# Patient Record
Sex: FEMALE
[De-identification: names, ages, dates, MRNs, and addresses within clinical notes are randomized per-mention and may not be internally consistent; named-entity substitution may affect disease eponyms.]

---

## 2024-09-10 ENCOUNTER — NON-APPOINTMENT (OUTPATIENT)
Age: 29
End: 2024-09-10

## 2024-09-11 ENCOUNTER — APPOINTMENT (OUTPATIENT)
Dept: OBGYN | Facility: CLINIC | Age: 29
End: 2024-09-11
Payer: MEDICAID

## 2024-09-11 ENCOUNTER — OUTPATIENT (OUTPATIENT)
Dept: OUTPATIENT SERVICES | Facility: HOSPITAL | Age: 29
LOS: 1 days | End: 2024-09-11
Payer: MEDICAID

## 2024-09-11 VITALS
DIASTOLIC BLOOD PRESSURE: 64 MMHG | BODY MASS INDEX: 26.65 KG/M2 | SYSTOLIC BLOOD PRESSURE: 98 MMHG | HEIGHT: 64.5 IN | HEART RATE: 76 BPM | OXYGEN SATURATION: 100 % | WEIGHT: 158 LBS | RESPIRATION RATE: 18 BRPM | TEMPERATURE: 99.1 F

## 2024-09-11 DIAGNOSIS — Z34.00 ENCOUNTER FOR SUPERVISION OF NORMAL FIRST PREGNANCY, UNSPECIFIED TRIMESTER: ICD-10-CM

## 2024-09-11 DIAGNOSIS — Z12.39 ENCOUNTER FOR OTHER SCREENING FOR MALIGNANT NEOPLASM OF BREAST: ICD-10-CM

## 2024-09-11 DIAGNOSIS — Z11.3 ENCOUNTER FOR SCREENING FOR INFECTIONS WITH A PREDOMINANTLY SEXUAL MODE OF TRANSMISSION: ICD-10-CM

## 2024-09-11 DIAGNOSIS — Z3A.18 18 WEEKS GESTATION OF PREGNANCY: ICD-10-CM

## 2024-09-11 DIAGNOSIS — Z01.419 ENCOUNTER FOR GYNECOLOGICAL EXAMINATION (GENERAL) (ROUTINE) W/OUT ABNORMAL FINDINGS: ICD-10-CM

## 2024-09-11 DIAGNOSIS — Z12.4 ENCOUNTER FOR SCREENING FOR MALIGNANT NEOPLASM OF CERVIX: ICD-10-CM

## 2024-09-11 PROBLEM — Z00.00 ENCOUNTER FOR PREVENTIVE HEALTH EXAMINATION: Status: ACTIVE | Noted: 2024-09-11

## 2024-09-11 PROCEDURE — 81420 FETAL CHRMOML ANEUPLOIDY: CPT

## 2024-09-11 PROCEDURE — 87491 CHLMYD TRACH DNA AMP PROBE: CPT

## 2024-09-11 PROCEDURE — 87624 HPV HI-RISK TYP POOLED RSLT: CPT

## 2024-09-11 PROCEDURE — 81329 SMN1 GENE DOS/DELETION ALYS: CPT

## 2024-09-11 PROCEDURE — 86850 RBC ANTIBODY SCREEN: CPT

## 2024-09-11 PROCEDURE — 86762 RUBELLA ANTIBODY: CPT

## 2024-09-11 PROCEDURE — 86787 VARICELLA-ZOSTER ANTIBODY: CPT

## 2024-09-11 PROCEDURE — 86780 TREPONEMA PALLIDUM: CPT

## 2024-09-11 PROCEDURE — 81243 FMR1 GEN ALY DETC ABNL ALLEL: CPT

## 2024-09-11 PROCEDURE — 83655 ASSAY OF LEAD: CPT

## 2024-09-11 PROCEDURE — 83020 HEMOGLOBIN ELECTROPHORESIS: CPT

## 2024-09-11 PROCEDURE — 83036 HEMOGLOBIN GLYCOSYLATED A1C: CPT

## 2024-09-11 PROCEDURE — 81220 CFTR GENE COM VARIANTS: CPT

## 2024-09-11 PROCEDURE — 82105 ALPHA-FETOPROTEIN SERUM: CPT

## 2024-09-11 PROCEDURE — 82306 VITAMIN D 25 HYDROXY: CPT

## 2024-09-11 PROCEDURE — 84443 ASSAY THYROID STIM HORMONE: CPT

## 2024-09-11 PROCEDURE — 81003 URINALYSIS AUTO W/O SCOPE: CPT

## 2024-09-11 PROCEDURE — 86765 RUBEOLA ANTIBODY: CPT

## 2024-09-11 PROCEDURE — 86480 TB TEST CELL IMMUN MEASURE: CPT

## 2024-09-11 PROCEDURE — 86803 HEPATITIS C AB TEST: CPT

## 2024-09-11 PROCEDURE — 99204 OFFICE O/P NEW MOD 45 MIN: CPT | Mod: TH,25

## 2024-09-11 PROCEDURE — 36415 COLL VENOUS BLD VENIPUNCTURE: CPT

## 2024-09-11 PROCEDURE — 87389 HIV-1 AG W/HIV-1&-2 AB AG IA: CPT

## 2024-09-11 PROCEDURE — 87086 URINE CULTURE/COLONY COUNT: CPT

## 2024-09-11 PROCEDURE — 81025 URINE PREGNANCY TEST: CPT

## 2024-09-11 PROCEDURE — 84439 ASSAY OF FREE THYROXINE: CPT

## 2024-09-11 PROCEDURE — G0463: CPT

## 2024-09-11 PROCEDURE — 87340 HEPATITIS B SURFACE AG IA: CPT

## 2024-09-11 PROCEDURE — 86900 BLOOD TYPING SEROLOGIC ABO: CPT

## 2024-09-11 PROCEDURE — 85025 COMPLETE CBC W/AUTO DIFF WBC: CPT

## 2024-09-11 PROCEDURE — 87591 N.GONORRHOEAE DNA AMP PROB: CPT

## 2024-09-11 RX ORDER — CHOLECALCIFEROL (VITAMIN D3) 25 MCG
TABLET ORAL
Refills: 0 | Status: COMPLETED | COMMUNITY
End: 2024-09-11

## 2024-09-11 RX ORDER — PNV NO.95/FERROUS FUM/FOLIC AC 28MG-0.8MG
28-0.8 TABLET ORAL DAILY
Qty: 30 | Refills: 11 | Status: ACTIVE | COMMUNITY
Start: 2024-09-11 | End: 1900-01-01

## 2024-09-12 DIAGNOSIS — O99.019 ANEMIA COMPLICATING PREGNANCY, UNSPECIFIED TRIMESTER: ICD-10-CM

## 2024-09-12 LAB
25(OH)D3 SERPL-MCNC: 27.6 NG/ML
ABO + RH PNL BLD: NORMAL
BASOPHILS # BLD AUTO: 0.03 K/UL
BASOPHILS NFR BLD AUTO: 0.5 %
BLD GP AB SCN SERPL QL: NORMAL
C TRACH RRNA SPEC QL NAA+PROBE: NOT DETECTED
EOSINOPHIL # BLD AUTO: 0.05 K/UL
EOSINOPHIL NFR BLD AUTO: 0.9 %
ESTIMATED AVERAGE GLUCOSE: 108 MG/DL
HBA1C MFR BLD HPLC: 5.4 %
HBV SURFACE AG SER QL: NONREACTIVE
HCT VFR BLD CALC: 31.2 %
HCV AB SER QL: NONREACTIVE
HCV S/CO RATIO: 0.12 S/CO
HGB BLD-MCNC: 10 G/DL
HIV1+2 AB SPEC QL IA.RAPID: NONREACTIVE
IMM GRANULOCYTES NFR BLD AUTO: 0.7 %
LYMPHOCYTES # BLD AUTO: 2.03 K/UL
LYMPHOCYTES NFR BLD AUTO: 37 %
MAN DIFF?: NORMAL
MCHC RBC-ENTMCNC: 26.9 PG
MCHC RBC-ENTMCNC: 32.1 GM/DL
MCV RBC AUTO: 83.9 FL
MONOCYTES # BLD AUTO: 0.21 K/UL
MONOCYTES NFR BLD AUTO: 3.8 %
N GONORRHOEA RRNA SPEC QL NAA+PROBE: NOT DETECTED
NEUTROPHILS # BLD AUTO: 3.12 K/UL
NEUTROPHILS NFR BLD AUTO: 57.1 %
PLATELET # BLD AUTO: 359 K/UL
RBC # BLD: 3.72 M/UL
RBC # FLD: 13.7 %
SOURCE TP AMPLIFICATION: NORMAL
T4 FREE SERPL-MCNC: 1.1 NG/DL
TSH SERPL-ACNC: 0.99 UIU/ML
WBC # FLD AUTO: 5.48 K/UL

## 2024-09-12 RX ORDER — SAW PALMETTO 160 MG
500 CAPSULE ORAL
Qty: 60 | Refills: 9 | Status: ACTIVE | COMMUNITY
Start: 2024-09-12 | End: 1900-01-01

## 2024-09-12 RX ORDER — FERROUS SULFATE 325(65) MG
325 (65 FE) TABLET ORAL TWICE DAILY
Qty: 60 | Refills: 9 | Status: ACTIVE | COMMUNITY
Start: 2024-09-12 | End: 1900-01-01

## 2024-09-13 DIAGNOSIS — D57.3 SICKLE-CELL TRAIT: ICD-10-CM

## 2024-09-13 LAB
AF-AFP DISCLAIMER: NORMAL
AFP  MOM: 1.16
AFP CONCENTRATION: 63.13 NG/ML
AFP INTERPRETATION: NORMAL
AFP MOM CUT-OFF: 2.8
AFP PERCENTILE: 67.5
AFP SCREENING RESULT: NORMAL
AFTER SCREENING RISK OPEN SPINA BIFIDA: NORMAL
BACTERIA UR CULT: NORMAL
BEFORE SCREENING RISK OPEN SPINA BIFIDA: NORMAL
CARBAMAZEPINE?: NO
CURRENT SMOKER: NO
ESTIMATED DUE DATE: NORMAL
EXTREME ANALYTE ALERT: NO
FAMILY HISTORY OPEN SPINA BIFIDA: NO
GESTATIONAL  AGE: NORMAL
GESTATIONAL AGE METHOD: NORMAL
HGB A MFR BLD: 61.1 %
HGB A2 MFR BLD: 1.6 %
HGB F MFR BLD: 0.8 %
HGB FRACT BLD-IMP: NORMAL
HGB S BLD QL SOLY: POSITIVE
HGB S MFR BLD: 35.1 %
HPV HIGH+LOW RISK DNA PNL CVX: NOT DETECTED
INSULIN DEPEND DIABETES: NO
LEAD BLD-MCNC: 1.5 UG/DL
MATERNAL WGT: 158
MEV IGG FLD QL IA: 18.8 AU/ML
MEV IGG+IGM SER-IMP: POSITIVE
MULTIPLE PREGNANCY STATUS: NORMAL
RACE/ETHNICITY: NORMAL
RUBV IGG FLD-ACNC: 20.2 INDEX
RUBV IGG SER-IMP: POSITIVE
T PALLIDUM AB SER QL IA: NEGATIVE
VALPROIC ACID?: NO
VZV AB TITR SER: POSITIVE
VZV IGG SER IF-ACNC: 4.37 S/CO

## 2024-09-16 DIAGNOSIS — Z01.419 ENCOUNTER FOR GYNECOLOGICAL EXAMINATION (GENERAL) (ROUTINE) WITHOUT ABNORMAL FINDINGS: ICD-10-CM

## 2024-09-16 DIAGNOSIS — Z12.39 ENCOUNTER FOR OTHER SCREENING FOR MALIGNANT NEOPLASM OF BREAST: ICD-10-CM

## 2024-09-16 DIAGNOSIS — Z11.3 ENCOUNTER FOR SCREENING FOR INFECTIONS WITH A PREDOMINANTLY SEXUAL MODE OF TRANSMISSION: ICD-10-CM

## 2024-09-16 DIAGNOSIS — Z12.4 ENCOUNTER FOR SCREENING FOR MALIGNANT NEOPLASM OF CERVIX: ICD-10-CM

## 2024-09-16 DIAGNOSIS — Z3A.18 18 WEEKS GESTATION OF PREGNANCY: ICD-10-CM

## 2024-09-16 LAB
CHROMOSOME13 INTERPRETATION: NORMAL
CHROMOSOME13 TEST RESULT: NORMAL
CHROMOSOME18 INTERPRETATION: NORMAL
CHROMOSOME18 TEST RESULT: NORMAL
CHROMOSOME21 INTERPRETATION: NORMAL
CHROMOSOME21 TEST RESULT: NORMAL
CYTOLOGY CVX/VAG DOC THIN PREP: NORMAL
FETAL FRACTION: NORMAL
PERFORMANCE AND LIMITATIONS: NORMAL
SEX CHROMOSOME INTERPRETATION: NORMAL
SEX CHROMOSOME TEST RESULT: NORMAL
VERIFI PRENATAL TEST: NOT DETECTED

## 2024-09-16 RX ORDER — PRENATAL WITH FERROUS FUM AND FOLIC ACID 3080; 920; 120; 400; 22; 1.84; 3; 20; 10; 1; 12; 200; 27; 25; 2 [IU]/1; [IU]/1; MG/1; [IU]/1; MG/1; MG/1; MG/1; MG/1; MG/1; MG/1; UG/1; MG/1; MG/1; MG/1; MG/1
27-1 TABLET ORAL DAILY
Qty: 30 | Refills: 11 | Status: ACTIVE | COMMUNITY
Start: 2024-09-16 | End: 1900-01-01

## 2024-09-17 LAB
AR GENE MUT ANL BLD/T: NORMAL
FMR1 GENE MUT ANL BLD/T: NORMAL
M TB IFN-G BLD-IMP: NEGATIVE
QUANTIFERON TB PLUS MITOGEN MINUS NIL: 3.27 IU/ML
QUANTIFERON TB PLUS NIL: 0.04 IU/ML
QUANTIFERON TB PLUS TB1 MINUS NIL: 0.04 IU/ML
QUANTIFERON TB PLUS TB2 MINUS NIL: 0.01 IU/ML

## 2024-09-20 LAB — CFTR MUT TESTED BLD/T: NEGATIVE

## 2024-09-25 ENCOUNTER — APPOINTMENT (OUTPATIENT)
Dept: ANTEPARTUM | Facility: CLINIC | Age: 29
End: 2024-09-25
Payer: MEDICAID

## 2024-09-25 ENCOUNTER — ASOB RESULT (OUTPATIENT)
Age: 29
End: 2024-09-25

## 2024-09-25 PROCEDURE — 76811 OB US DETAILED SNGL FETUS: CPT

## 2024-10-09 ENCOUNTER — OUTPATIENT (OUTPATIENT)
Dept: OUTPATIENT SERVICES | Facility: HOSPITAL | Age: 29
LOS: 1 days | End: 2024-10-09
Payer: MEDICAID

## 2024-10-09 ENCOUNTER — APPOINTMENT (OUTPATIENT)
Dept: OBGYN | Facility: CLINIC | Age: 29
End: 2024-10-09
Payer: MEDICAID

## 2024-10-09 VITALS
HEART RATE: 92 BPM | TEMPERATURE: 97.5 F | WEIGHT: 170 LBS | DIASTOLIC BLOOD PRESSURE: 73 MMHG | OXYGEN SATURATION: 100 % | RESPIRATION RATE: 18 BRPM | BODY MASS INDEX: 28.67 KG/M2 | HEIGHT: 64.5 IN | SYSTOLIC BLOOD PRESSURE: 111 MMHG

## 2024-10-09 DIAGNOSIS — D57.3 SICKLE-CELL TRAIT: ICD-10-CM

## 2024-10-09 DIAGNOSIS — Z34.92 ENCOUNTER FOR SUPERVISION OF NORMAL PREGNANCY, UNSPECIFIED, SECOND TRIMESTER: ICD-10-CM

## 2024-10-09 DIAGNOSIS — R79.89 OTHER SPECIFIED ABNORMAL FINDINGS OF BLOOD CHEMISTRY: ICD-10-CM

## 2024-10-09 DIAGNOSIS — Z11.3 ENCOUNTER FOR SCREENING FOR INFECTIONS WITH A PREDOMINANTLY SEXUAL MODE OF TRANSMISSION: ICD-10-CM

## 2024-10-09 DIAGNOSIS — O99.019 ANEMIA COMPLICATING PREGNANCY, UNSPECIFIED TRIMESTER: ICD-10-CM

## 2024-10-09 DIAGNOSIS — Z12.39 ENCOUNTER FOR OTHER SCREENING FOR MALIGNANT NEOPLASM OF BREAST: ICD-10-CM

## 2024-10-09 DIAGNOSIS — Z01.419 ENCOUNTER FOR GYNECOLOGICAL EXAMINATION (GENERAL) (ROUTINE) W/OUT ABNORMAL FINDINGS: ICD-10-CM

## 2024-10-09 DIAGNOSIS — Z34.00 ENCOUNTER FOR SUPERVISION OF NORMAL FIRST PREGNANCY, UNSPECIFIED TRIMESTER: ICD-10-CM

## 2024-10-09 DIAGNOSIS — Z3A.18 18 WEEKS GESTATION OF PREGNANCY: ICD-10-CM

## 2024-10-09 DIAGNOSIS — Z98.890 OTHER SPECIFIED POSTPROCEDURAL STATES: ICD-10-CM

## 2024-10-09 PROCEDURE — 99214 OFFICE O/P EST MOD 30 MIN: CPT | Mod: TH

## 2024-10-09 PROCEDURE — 81003 URINALYSIS AUTO W/O SCOPE: CPT

## 2024-10-09 PROCEDURE — G0463: CPT

## 2024-10-09 RX ORDER — CHOLECALCIFEROL (VITAMIN D3) 25 MCG
25 MCG TABLET,CHEWABLE ORAL
Qty: 30 | Refills: 11 | Status: ACTIVE | COMMUNITY
Start: 2024-10-09 | End: 1900-01-01

## 2024-10-10 ENCOUNTER — APPOINTMENT (OUTPATIENT)
Dept: ANTEPARTUM | Facility: CLINIC | Age: 29
End: 2024-10-10

## 2024-10-10 DIAGNOSIS — D57.3 SICKLE-CELL TRAIT: ICD-10-CM

## 2024-10-10 DIAGNOSIS — O99.019 ANEMIA COMPLICATING PREGNANCY, UNSPECIFIED TRIMESTER: ICD-10-CM

## 2024-10-10 DIAGNOSIS — R79.89 OTHER SPECIFIED ABNORMAL FINDINGS OF BLOOD CHEMISTRY: ICD-10-CM

## 2024-10-10 DIAGNOSIS — Z3A.22 22 WEEKS GESTATION OF PREGNANCY: ICD-10-CM

## 2024-11-07 ENCOUNTER — NON-APPOINTMENT (OUTPATIENT)
Age: 29
End: 2024-11-07

## 2024-11-08 ENCOUNTER — OUTPATIENT (OUTPATIENT)
Dept: OUTPATIENT SERVICES | Facility: HOSPITAL | Age: 29
LOS: 1 days | End: 2024-11-08
Payer: MEDICAID

## 2024-11-08 ENCOUNTER — APPOINTMENT (OUTPATIENT)
Dept: OBGYN | Facility: CLINIC | Age: 29
End: 2024-11-08

## 2024-11-08 VITALS
DIASTOLIC BLOOD PRESSURE: 67 MMHG | HEIGHT: 64 IN | WEIGHT: 172 LBS | BODY MASS INDEX: 29.37 KG/M2 | SYSTOLIC BLOOD PRESSURE: 107 MMHG | TEMPERATURE: 97.2 F | RESPIRATION RATE: 18 BRPM | HEART RATE: 91 BPM | OXYGEN SATURATION: 98 %

## 2024-11-08 DIAGNOSIS — Z34.92 ENCOUNTER FOR SUPERVISION OF NORMAL PREGNANCY, UNSPECIFIED, SECOND TRIMESTER: ICD-10-CM

## 2024-11-08 DIAGNOSIS — O99.019 ANEMIA COMPLICATING PREGNANCY, UNSPECIFIED TRIMESTER: ICD-10-CM

## 2024-11-08 DIAGNOSIS — Z34.00 ENCOUNTER FOR SUPERVISION OF NORMAL FIRST PREGNANCY, UNSPECIFIED TRIMESTER: ICD-10-CM

## 2024-11-08 DIAGNOSIS — D57.3 SICKLE-CELL TRAIT: ICD-10-CM

## 2024-11-08 DIAGNOSIS — R79.89 OTHER SPECIFIED ABNORMAL FINDINGS OF BLOOD CHEMISTRY: ICD-10-CM

## 2024-11-08 PROCEDURE — G0463: CPT

## 2024-11-08 PROCEDURE — 99214 OFFICE O/P EST MOD 30 MIN: CPT | Mod: TH

## 2024-11-08 PROCEDURE — 81003 URINALYSIS AUTO W/O SCOPE: CPT

## 2024-11-11 ENCOUNTER — ASOB RESULT (OUTPATIENT)
Age: 29
End: 2024-11-11

## 2024-11-11 ENCOUNTER — APPOINTMENT (OUTPATIENT)
Dept: ANTEPARTUM | Facility: CLINIC | Age: 29
End: 2024-11-11
Payer: MEDICAID

## 2024-11-11 DIAGNOSIS — D57.3 SICKLE-CELL TRAIT: ICD-10-CM

## 2024-11-11 DIAGNOSIS — R79.89 OTHER SPECIFIED ABNORMAL FINDINGS OF BLOOD CHEMISTRY: ICD-10-CM

## 2024-11-11 DIAGNOSIS — Z34.92 ENCOUNTER FOR SUPERVISION OF NORMAL PREGNANCY, UNSPECIFIED, SECOND TRIMESTER: ICD-10-CM

## 2024-11-11 DIAGNOSIS — O99.019 ANEMIA COMPLICATING PREGNANCY, UNSPECIFIED TRIMESTER: ICD-10-CM

## 2024-11-11 PROCEDURE — 76816 OB US FOLLOW-UP PER FETUS: CPT

## 2024-11-22 ENCOUNTER — NON-APPOINTMENT (OUTPATIENT)
Age: 29
End: 2024-11-22

## 2024-11-25 ENCOUNTER — OUTPATIENT (OUTPATIENT)
Dept: OUTPATIENT SERVICES | Facility: HOSPITAL | Age: 29
LOS: 1 days | End: 2024-11-25
Payer: MEDICAID

## 2024-11-25 ENCOUNTER — APPOINTMENT (OUTPATIENT)
Dept: OBGYN | Facility: CLINIC | Age: 29
End: 2024-11-25
Payer: MEDICAID

## 2024-11-25 VITALS
HEIGHT: 64 IN | SYSTOLIC BLOOD PRESSURE: 114 MMHG | DIASTOLIC BLOOD PRESSURE: 75 MMHG | OXYGEN SATURATION: 99 % | HEART RATE: 105 BPM | WEIGHT: 175 LBS | TEMPERATURE: 97 F | RESPIRATION RATE: 18 BRPM | BODY MASS INDEX: 29.88 KG/M2

## 2024-11-25 DIAGNOSIS — Z71.85 ENCOUNTER FOR IMMUNIZATION SAFETY COUNSELING: ICD-10-CM

## 2024-11-25 DIAGNOSIS — O99.019 ANEMIA COMPLICATING PREGNANCY, UNSPECIFIED TRIMESTER: ICD-10-CM

## 2024-11-25 DIAGNOSIS — D57.3 SICKLE-CELL TRAIT: ICD-10-CM

## 2024-11-25 DIAGNOSIS — Z34.00 ENCOUNTER FOR SUPERVISION OF NORMAL FIRST PREGNANCY, UNSPECIFIED TRIMESTER: ICD-10-CM

## 2024-11-25 DIAGNOSIS — Z34.92 ENCOUNTER FOR SUPERVISION OF NORMAL PREGNANCY, UNSPECIFIED, SECOND TRIMESTER: ICD-10-CM

## 2024-11-25 DIAGNOSIS — R79.89 OTHER SPECIFIED ABNORMAL FINDINGS OF BLOOD CHEMISTRY: ICD-10-CM

## 2024-11-25 DIAGNOSIS — Z34.93 ENCOUNTER FOR SUPERVISION OF NORMAL PREGNANCY, UNSPECIFIED, THIRD TRIMESTER: ICD-10-CM

## 2024-11-25 DIAGNOSIS — Z23 ENCOUNTER FOR IMMUNIZATION: ICD-10-CM

## 2024-11-25 PROCEDURE — 82570 ASSAY OF URINE CREATININE: CPT

## 2024-11-25 PROCEDURE — 96372 THER/PROPH/DIAG INJ SC/IM: CPT

## 2024-11-25 PROCEDURE — 86780 TREPONEMA PALLIDUM: CPT

## 2024-11-25 PROCEDURE — 80053 COMPREHEN METABOLIC PANEL: CPT

## 2024-11-25 PROCEDURE — 84550 ASSAY OF BLOOD/URIC ACID: CPT

## 2024-11-25 PROCEDURE — 36415 COLL VENOUS BLD VENIPUNCTURE: CPT

## 2024-11-25 PROCEDURE — 90715 TDAP VACCINE 7 YRS/> IM: CPT

## 2024-11-25 PROCEDURE — 82950 GLUCOSE TEST: CPT

## 2024-11-25 PROCEDURE — 90715 TDAP VACCINE 7 YRS/> IM: CPT | Mod: NC

## 2024-11-25 PROCEDURE — G0463: CPT

## 2024-11-25 PROCEDURE — 99214 OFFICE O/P EST MOD 30 MIN: CPT | Mod: TH,25

## 2024-11-25 PROCEDURE — 83036 HEMOGLOBIN GLYCOSYLATED A1C: CPT

## 2024-11-25 PROCEDURE — 85025 COMPLETE CBC W/AUTO DIFF WBC: CPT

## 2024-11-25 PROCEDURE — 84156 ASSAY OF PROTEIN URINE: CPT

## 2024-11-26 ENCOUNTER — NON-APPOINTMENT (OUTPATIENT)
Age: 29
End: 2024-11-26

## 2024-11-26 DIAGNOSIS — R79.89 OTHER SPECIFIED ABNORMAL FINDINGS OF BLOOD CHEMISTRY: ICD-10-CM

## 2024-11-26 DIAGNOSIS — Z34.93 ENCOUNTER FOR SUPERVISION OF NORMAL PREGNANCY, UNSPECIFIED, THIRD TRIMESTER: ICD-10-CM

## 2024-11-26 DIAGNOSIS — Z23 ENCOUNTER FOR IMMUNIZATION: ICD-10-CM

## 2024-11-26 DIAGNOSIS — O24.410 GESTATIONAL DIABETES MELLITUS IN PREGNANCY, DIET CONTROLLED: ICD-10-CM

## 2024-11-26 DIAGNOSIS — O99.019 ANEMIA COMPLICATING PREGNANCY, UNSPECIFIED TRIMESTER: ICD-10-CM

## 2024-11-26 DIAGNOSIS — D57.3 SICKLE-CELL TRAIT: ICD-10-CM

## 2024-11-26 LAB
ALBUMIN SERPL ELPH-MCNC: 3.6 G/DL
ALP BLD-CCNC: 109 U/L
ALT SERPL-CCNC: 9 U/L
ANION GAP SERPL CALC-SCNC: 11 MMOL/L
AST SERPL-CCNC: 10 U/L
BASOPHILS # BLD AUTO: 0.02 K/UL
BASOPHILS NFR BLD AUTO: 0.3 %
BILIRUB SERPL-MCNC: 0.3 MG/DL
BUN SERPL-MCNC: 7 MG/DL
CALCIUM SERPL-MCNC: 8.3 MG/DL
CHLORIDE SERPL-SCNC: 102 MMOL/L
CO2 SERPL-SCNC: 21 MMOL/L
CREAT SERPL-MCNC: 0.63 MG/DL
CREAT SPEC-SCNC: 193 MG/DL
CREAT/PROT UR: 0.1 RATIO
EGFR: 122 ML/MIN/1.73M2
EOSINOPHIL # BLD AUTO: 0.07 K/UL
EOSINOPHIL NFR BLD AUTO: 1.1 %
ESTIMATED AVERAGE GLUCOSE: 108 MG/DL
GLUCOSE 1H P 50 G GLC PO SERPL-MCNC: 179 MG/DL
GLUCOSE SERPL-MCNC: 180 MG/DL
HBA1C MFR BLD HPLC: 5.4 %
HCT VFR BLD CALC: 34.7 %
HGB BLD-MCNC: 11.7 G/DL
IMM GRANULOCYTES NFR BLD AUTO: 0.5 %
LYMPHOCYTES # BLD AUTO: 2.02 K/UL
LYMPHOCYTES NFR BLD AUTO: 30.7 %
MAN DIFF?: NORMAL
MCHC RBC-ENTMCNC: 27.1 PG
MCHC RBC-ENTMCNC: 33.7 G/DL
MCV RBC AUTO: 80.3 FL
MONOCYTES # BLD AUTO: 0.23 K/UL
MONOCYTES NFR BLD AUTO: 3.5 %
NEUTROPHILS # BLD AUTO: 4.2 K/UL
NEUTROPHILS NFR BLD AUTO: 63.9 %
PLATELET # BLD AUTO: 239 K/UL
POTASSIUM SERPL-SCNC: 3.8 MMOL/L
PROT SERPL-MCNC: 6.7 G/DL
PROT UR-MCNC: 13 MG/DL
RBC # BLD: 4.32 M/UL
RBC # FLD: 12.8 %
SODIUM SERPL-SCNC: 134 MMOL/L
T PALLIDUM AB SER QL IA: NEGATIVE
URATE SERPL-MCNC: 4.2 MG/DL
WBC # FLD AUTO: 6.57 K/UL

## 2024-11-26 RX ORDER — LANCETS
EACH MISCELLANEOUS
Qty: 1 | Refills: 6 | Status: ACTIVE | COMMUNITY
Start: 2024-11-26 | End: 1900-01-01

## 2024-11-26 RX ORDER — ISOPROPYL ALCOHOL 0.7 ML/ML
SWAB TOPICAL
Qty: 1 | Refills: 6 | Status: ACTIVE | COMMUNITY
Start: 2024-11-26 | End: 1900-01-01

## 2024-11-26 RX ORDER — BLOOD-GLUCOSE METER
EACH MISCELLANEOUS
Qty: 1 | Refills: 0 | Status: ACTIVE | COMMUNITY
Start: 2024-11-26 | End: 1900-01-01

## 2024-11-26 RX ORDER — BLOOD SUGAR DIAGNOSTIC
STRIP MISCELLANEOUS 4 TIMES DAILY
Qty: 1 | Refills: 6 | Status: ACTIVE | COMMUNITY
Start: 2024-11-26 | End: 1900-01-01

## 2024-12-05 ENCOUNTER — APPOINTMENT (OUTPATIENT)
Dept: MATERNAL FETAL MEDICINE | Facility: CLINIC | Age: 29
End: 2024-12-05
Payer: MEDICAID

## 2024-12-05 ENCOUNTER — ASOB RESULT (OUTPATIENT)
Age: 29
End: 2024-12-05

## 2024-12-05 PROCEDURE — G0108 DIAB MANAGE TRN  PER INDIV: CPT | Mod: 95

## 2024-12-12 ENCOUNTER — ASOB RESULT (OUTPATIENT)
Age: 29
End: 2024-12-12

## 2024-12-12 ENCOUNTER — APPOINTMENT (OUTPATIENT)
Dept: MATERNAL FETAL MEDICINE | Facility: CLINIC | Age: 29
End: 2024-12-12

## 2024-12-12 PROCEDURE — G0108 DIAB MANAGE TRN  PER INDIV: CPT | Mod: 95

## 2024-12-13 ENCOUNTER — NON-APPOINTMENT (OUTPATIENT)
Age: 29
End: 2024-12-13

## 2024-12-16 ENCOUNTER — OUTPATIENT (OUTPATIENT)
Dept: OUTPATIENT SERVICES | Facility: HOSPITAL | Age: 29
LOS: 1 days | End: 2024-12-16
Payer: MEDICAID

## 2024-12-16 ENCOUNTER — APPOINTMENT (OUTPATIENT)
Dept: OBGYN | Facility: CLINIC | Age: 29
End: 2024-12-16
Payer: MEDICAID

## 2024-12-16 VITALS
WEIGHT: 186 LBS | OXYGEN SATURATION: 100 % | TEMPERATURE: 97.1 F | BODY MASS INDEX: 31.76 KG/M2 | HEART RATE: 109 BPM | HEIGHT: 64 IN | RESPIRATION RATE: 18 BRPM | DIASTOLIC BLOOD PRESSURE: 70 MMHG | SYSTOLIC BLOOD PRESSURE: 115 MMHG

## 2024-12-16 DIAGNOSIS — Z34.93 ENCOUNTER FOR SUPERVISION OF NORMAL PREGNANCY, UNSPECIFIED, THIRD TRIMESTER: ICD-10-CM

## 2024-12-16 DIAGNOSIS — Z71.85 ENCOUNTER FOR IMMUNIZATION SAFETY COUNSELING: ICD-10-CM

## 2024-12-16 DIAGNOSIS — Z23 ENCOUNTER FOR IMMUNIZATION: ICD-10-CM

## 2024-12-16 DIAGNOSIS — D57.3 SICKLE-CELL TRAIT: ICD-10-CM

## 2024-12-16 DIAGNOSIS — O26.00 EXCESSIVE WEIGHT GAIN IN PREGNANCY, UNSPECIFIED TRIMESTER: ICD-10-CM

## 2024-12-16 DIAGNOSIS — O99.019 ANEMIA COMPLICATING PREGNANCY, UNSPECIFIED TRIMESTER: ICD-10-CM

## 2024-12-16 DIAGNOSIS — Z34.00 ENCOUNTER FOR SUPERVISION OF NORMAL FIRST PREGNANCY, UNSPECIFIED TRIMESTER: ICD-10-CM

## 2024-12-16 DIAGNOSIS — R79.89 OTHER SPECIFIED ABNORMAL FINDINGS OF BLOOD CHEMISTRY: ICD-10-CM

## 2024-12-16 DIAGNOSIS — O24.410 GESTATIONAL DIABETES MELLITUS IN PREGNANCY, DIET CONTROLLED: ICD-10-CM

## 2024-12-16 PROCEDURE — 81003 URINALYSIS AUTO W/O SCOPE: CPT

## 2024-12-16 PROCEDURE — G0463: CPT

## 2024-12-16 PROCEDURE — 99214 OFFICE O/P EST MOD 30 MIN: CPT | Mod: TH

## 2024-12-17 DIAGNOSIS — R79.89 OTHER SPECIFIED ABNORMAL FINDINGS OF BLOOD CHEMISTRY: ICD-10-CM

## 2024-12-17 DIAGNOSIS — O24.410 GESTATIONAL DIABETES MELLITUS IN PREGNANCY, DIET CONTROLLED: ICD-10-CM

## 2024-12-17 DIAGNOSIS — O99.019 ANEMIA COMPLICATING PREGNANCY, UNSPECIFIED TRIMESTER: ICD-10-CM

## 2024-12-17 DIAGNOSIS — O26.00 EXCESSIVE WEIGHT GAIN IN PREGNANCY, UNSPECIFIED TRIMESTER: ICD-10-CM

## 2024-12-17 DIAGNOSIS — D57.3 SICKLE-CELL TRAIT: ICD-10-CM

## 2024-12-17 DIAGNOSIS — Z3A.32 32 WEEKS GESTATION OF PREGNANCY: ICD-10-CM

## 2024-12-19 ENCOUNTER — APPOINTMENT (OUTPATIENT)
Dept: MATERNAL FETAL MEDICINE | Facility: CLINIC | Age: 29
End: 2024-12-19

## 2024-12-23 ENCOUNTER — APPOINTMENT (OUTPATIENT)
Dept: MATERNAL FETAL MEDICINE | Facility: CLINIC | Age: 29
End: 2024-12-23

## 2024-12-24 ENCOUNTER — APPOINTMENT (OUTPATIENT)
Dept: MATERNAL FETAL MEDICINE | Facility: CLINIC | Age: 29
End: 2024-12-24
Payer: MEDICAID

## 2024-12-24 ENCOUNTER — ASOB RESULT (OUTPATIENT)
Age: 29
End: 2024-12-24

## 2024-12-24 DIAGNOSIS — O24.415 GESTATIONAL DIABETES MELLITUS IN PREGNANCY, CONTROLLED BY ORAL HYPOGLYCEMIC DRUGS: ICD-10-CM

## 2024-12-24 PROCEDURE — G0108 DIAB MANAGE TRN  PER INDIV: CPT

## 2024-12-24 RX ORDER — METFORMIN ER 500 MG 500 MG/1
500 TABLET ORAL
Qty: 30 | Refills: 0 | Status: ACTIVE | COMMUNITY
Start: 2024-12-24 | End: 1900-01-01

## 2024-12-31 ENCOUNTER — NON-APPOINTMENT (OUTPATIENT)
Age: 29
End: 2024-12-31

## 2024-12-31 ENCOUNTER — APPOINTMENT (OUTPATIENT)
Dept: MATERNAL FETAL MEDICINE | Facility: CLINIC | Age: 29
End: 2024-12-31
Payer: MEDICAID

## 2024-12-31 ENCOUNTER — INPATIENT (INPATIENT)
Facility: HOSPITAL | Age: 29
LOS: 2 days | Discharge: HOME HEALTH | End: 2025-01-03
Attending: SPECIALIST | Admitting: SPECIALIST
Payer: MEDICAID

## 2024-12-31 ENCOUNTER — ASOB RESULT (OUTPATIENT)
Age: 29
End: 2024-12-31

## 2024-12-31 ENCOUNTER — APPOINTMENT (OUTPATIENT)
Dept: ANTEPARTUM | Facility: CLINIC | Age: 29
End: 2024-12-31
Payer: MEDICAID

## 2024-12-31 VITALS
WEIGHT: 179.9 LBS | HEART RATE: 85 BPM | SYSTOLIC BLOOD PRESSURE: 107 MMHG | RESPIRATION RATE: 16 BRPM | DIASTOLIC BLOOD PRESSURE: 61 MMHG | TEMPERATURE: 98 F | HEIGHT: 66 IN

## 2024-12-31 DIAGNOSIS — O24.419 GESTATIONAL DIABETES MELLITUS IN PREGNANCY, UNSPECIFIED CONTROL: ICD-10-CM

## 2024-12-31 LAB
ALBUMIN SERPL ELPH-MCNC: 3.8 G/DL — SIGNIFICANT CHANGE UP (ref 3.3–5)
ALP SERPL-CCNC: 135 U/L — HIGH (ref 40–120)
ALT FLD-CCNC: 12 U/L — SIGNIFICANT CHANGE UP (ref 4–33)
ANION GAP SERPL CALC-SCNC: 12 MMOL/L — SIGNIFICANT CHANGE UP (ref 7–14)
APPEARANCE UR: CLEAR — SIGNIFICANT CHANGE UP
AST SERPL-CCNC: 12 U/L — SIGNIFICANT CHANGE UP (ref 4–32)
BASOPHILS # BLD AUTO: 0.02 K/UL — SIGNIFICANT CHANGE UP (ref 0–0.2)
BASOPHILS NFR BLD AUTO: 0.4 % — SIGNIFICANT CHANGE UP (ref 0–2)
BILIRUB SERPL-MCNC: 0.4 MG/DL — SIGNIFICANT CHANGE UP (ref 0.2–1.2)
BILIRUB UR-MCNC: NEGATIVE — SIGNIFICANT CHANGE UP
BLD GP AB SCN SERPL QL: NEGATIVE — SIGNIFICANT CHANGE UP
BUN SERPL-MCNC: 6 MG/DL — LOW (ref 7–23)
CALCIUM SERPL-MCNC: 8.6 MG/DL — SIGNIFICANT CHANGE UP (ref 8.4–10.5)
CHLORIDE SERPL-SCNC: 104 MMOL/L — SIGNIFICANT CHANGE UP (ref 98–107)
CO2 SERPL-SCNC: 21 MMOL/L — LOW (ref 22–31)
COLOR SPEC: YELLOW — SIGNIFICANT CHANGE UP
CREAT ?TM UR-MCNC: 96 MG/DL — SIGNIFICANT CHANGE UP
CREAT SERPL-MCNC: 0.47 MG/DL — LOW (ref 0.5–1.3)
DIFF PNL FLD: NEGATIVE — SIGNIFICANT CHANGE UP
EGFR: 131 ML/MIN/1.73M2 — SIGNIFICANT CHANGE UP
EOSINOPHIL # BLD AUTO: 0.04 K/UL — SIGNIFICANT CHANGE UP (ref 0–0.5)
EOSINOPHIL NFR BLD AUTO: 0.7 % — SIGNIFICANT CHANGE UP (ref 0–6)
GAS PNL BLDV: SIGNIFICANT CHANGE UP
GLUCOSE SERPL-MCNC: 78 MG/DL — SIGNIFICANT CHANGE UP (ref 70–99)
GLUCOSE UR QL: NEGATIVE MG/DL — SIGNIFICANT CHANGE UP
HCT VFR BLD CALC: 37.6 % — SIGNIFICANT CHANGE UP (ref 34.5–45)
HGB BLD-MCNC: 12.7 G/DL — SIGNIFICANT CHANGE UP (ref 11.5–15.5)
HIV 1+2 AB+HIV1 P24 AG SERPL QL IA: SIGNIFICANT CHANGE UP
IANC: 3.6 K/UL — SIGNIFICANT CHANGE UP (ref 1.8–7.4)
IMM GRANULOCYTES NFR BLD AUTO: 0.7 % — SIGNIFICANT CHANGE UP (ref 0–0.9)
KETONES UR-MCNC: NEGATIVE MG/DL — SIGNIFICANT CHANGE UP
LEUKOCYTE ESTERASE UR-ACNC: ABNORMAL
LYMPHOCYTES # BLD AUTO: 1.67 K/UL — SIGNIFICANT CHANGE UP (ref 1–3.3)
LYMPHOCYTES # BLD AUTO: 29.7 % — SIGNIFICANT CHANGE UP (ref 13–44)
MCHC RBC-ENTMCNC: 27.4 PG — SIGNIFICANT CHANGE UP (ref 27–34)
MCHC RBC-ENTMCNC: 33.8 G/DL — SIGNIFICANT CHANGE UP (ref 32–36)
MCV RBC AUTO: 81 FL — SIGNIFICANT CHANGE UP (ref 80–100)
MONOCYTES # BLD AUTO: 0.26 K/UL — SIGNIFICANT CHANGE UP (ref 0–0.9)
MONOCYTES NFR BLD AUTO: 4.6 % — SIGNIFICANT CHANGE UP (ref 2–14)
NEUTROPHILS # BLD AUTO: 3.6 K/UL — SIGNIFICANT CHANGE UP (ref 1.8–7.4)
NEUTROPHILS NFR BLD AUTO: 63.9 % — SIGNIFICANT CHANGE UP (ref 43–77)
NITRITE UR-MCNC: NEGATIVE — SIGNIFICANT CHANGE UP
NRBC # BLD: 0 /100 WBCS — SIGNIFICANT CHANGE UP (ref 0–0)
NRBC # FLD: 0 K/UL — SIGNIFICANT CHANGE UP (ref 0–0)
PH UR: 6.5 — SIGNIFICANT CHANGE UP (ref 5–8)
PLATELET # BLD AUTO: 236 K/UL — SIGNIFICANT CHANGE UP (ref 150–400)
POTASSIUM SERPL-MCNC: 4.1 MMOL/L — SIGNIFICANT CHANGE UP (ref 3.5–5.3)
POTASSIUM SERPL-SCNC: 4.1 MMOL/L — SIGNIFICANT CHANGE UP (ref 3.5–5.3)
PROT ?TM UR-MCNC: 12 MG/DL — SIGNIFICANT CHANGE UP
PROT SERPL-MCNC: 7.3 G/DL — SIGNIFICANT CHANGE UP (ref 6–8.3)
PROT UR-MCNC: NEGATIVE MG/DL — SIGNIFICANT CHANGE UP
PROT/CREAT UR-RTO: 0.1 RATIO — SIGNIFICANT CHANGE UP (ref 0–0.2)
RBC # BLD: 4.64 M/UL — SIGNIFICANT CHANGE UP (ref 3.8–5.2)
RBC # FLD: 13 % — SIGNIFICANT CHANGE UP (ref 10.3–14.5)
RH IG SCN BLD-IMP: POSITIVE — SIGNIFICANT CHANGE UP
RH IG SCN BLD-IMP: POSITIVE — SIGNIFICANT CHANGE UP
SODIUM SERPL-SCNC: 137 MMOL/L — SIGNIFICANT CHANGE UP (ref 135–145)
SP GR SPEC: 1.01 — SIGNIFICANT CHANGE UP (ref 1–1.03)
UROBILINOGEN FLD QL: 0.2 MG/DL — SIGNIFICANT CHANGE UP (ref 0.2–1)
WBC # BLD: 5.63 K/UL — SIGNIFICANT CHANGE UP (ref 3.8–10.5)
WBC # FLD AUTO: 5.63 K/UL — SIGNIFICANT CHANGE UP (ref 3.8–10.5)

## 2024-12-31 PROCEDURE — 76816 OB US FOLLOW-UP PER FETUS: CPT

## 2024-12-31 PROCEDURE — 76819 FETAL BIOPHYS PROFIL W/O NST: CPT | Mod: 59

## 2024-12-31 PROCEDURE — 99221 1ST HOSP IP/OBS SF/LOW 40: CPT

## 2024-12-31 PROCEDURE — G0108 DIAB MANAGE TRN  PER INDIV: CPT

## 2024-12-31 RX ORDER — INSULIN GLARGINE-YFGN 100 [IU]/ML
20 INJECTION, SOLUTION SUBCUTANEOUS AT BEDTIME
Refills: 0 | Status: DISCONTINUED | OUTPATIENT
Start: 2024-12-31 | End: 2025-01-01

## 2024-12-31 RX ORDER — HEPARIN SODIUM 1000 [USP'U]/ML
5000 INJECTION, SOLUTION INTRAVENOUS; SUBCUTANEOUS EVERY 12 HOURS
Refills: 0 | Status: DISCONTINUED | OUTPATIENT
Start: 2024-12-31 | End: 2025-01-03

## 2024-12-31 RX ORDER — GLUCAGON INJECTION, SOLUTION 0.5 MG/.1ML
1 INJECTION, SOLUTION SUBCUTANEOUS ONCE
Refills: 0 | Status: DISCONTINUED | OUTPATIENT
Start: 2024-12-31 | End: 2025-01-03

## 2024-12-31 RX ORDER — DEXTROSE MONOHYDRATE 25 G/50ML
15 INJECTION, SOLUTION INTRAVENOUS ONCE
Refills: 0 | Status: DISCONTINUED | OUTPATIENT
Start: 2024-12-31 | End: 2025-01-03

## 2024-12-31 RX ORDER — DEXTROSE MONOHYDRATE 25 G/50ML
12.5 INJECTION, SOLUTION INTRAVENOUS ONCE
Refills: 0 | Status: DISCONTINUED | OUTPATIENT
Start: 2024-12-31 | End: 2025-01-03

## 2024-12-31 RX ORDER — PNV/FERROUS FUM/DOCUSATE/FOLIC 90-50-1MG
1 TABLET, EXTENDED RELEASE ORAL DAILY
Refills: 0 | Status: DISCONTINUED | OUTPATIENT
Start: 2024-12-31 | End: 2025-01-03

## 2024-12-31 RX ORDER — SODIUM CHLORIDE 9 MG/ML
1000 INJECTION, SOLUTION INTRAVENOUS
Refills: 0 | Status: DISCONTINUED | OUTPATIENT
Start: 2024-12-31 | End: 2025-01-03

## 2024-12-31 RX ORDER — DEXTROSE MONOHYDRATE 25 G/50ML
25 INJECTION, SOLUTION INTRAVENOUS ONCE
Refills: 0 | Status: DISCONTINUED | OUTPATIENT
Start: 2024-12-31 | End: 2025-01-03

## 2024-12-31 RX ORDER — INSULIN GLARGINE-YFGN 100 [IU]/ML
25 INJECTION, SOLUTION SUBCUTANEOUS AT BEDTIME
Refills: 0 | Status: DISCONTINUED | OUTPATIENT
Start: 2024-12-31 | End: 2024-12-31

## 2024-12-31 RX ORDER — INSULIN LISPRO 100/ML
6 VIAL (ML) SUBCUTANEOUS
Refills: 0 | Status: DISCONTINUED | OUTPATIENT
Start: 2024-12-31 | End: 2024-12-31

## 2024-12-31 RX ADMIN — HEPARIN SODIUM 5000 UNIT(S): 1000 INJECTION, SOLUTION INTRAVENOUS; SUBCUTANEOUS at 18:30

## 2024-12-31 RX ADMIN — Medication 1 TABLET(S): at 20:15

## 2024-12-31 RX ADMIN — INSULIN GLARGINE-YFGN 20 UNIT(S): 100 INJECTION, SOLUTION SUBCUTANEOUS at 22:35

## 2024-12-31 NOTE — CONSULT NOTE ADULT - ASSESSMENT
30yo  at 34w3d, SINTIA 24, with poorly controlled GDM not currently on medications, admitted for assistance with glycemic control. Counseled the patient on risks of poorly controlled GDM with patient including risk of LGA and shoulder dystocia (patient already being at increased risk due to history) and risk of stillbirth. Discussed finger stick paneling and initiating insulin. Weight based dosing calculated to be approx 35u qhs and Lispro 8u ac, however will f/u fingersticks and consider starting at Lantus 25-30u qhs with Lispro  if patient is not out of range. Will start with pre and post-prandial fingersticks. F/u preprandial FS tonight after dinner. Continue with NST BID.     Seen and discussed with XOCHITL Mcduffie attending  Brielle Shaw MD, MFM Fellow   30yo  at 34w3d, SINTIA 24, with poorly controlled GDM not currently on medications, admitted for assistance with glycemic control. Counseled the patient on risks of poorly controlled GDM with patient including risk of LGA and shoulder dystocia (patient already being at increased risk due to history) and risk of stillbirth. Discussed finger stick paneling and initiating insulin. Weight based dosing calculated to be approx 35u qhs and Lispro 8u ac, however will f/u fingersticks and consider starting at Lantus 25-30u qhs with Lispro  if patient is not out of range. Will start with pre and post-prandial fingersticks. F/u preprandial FS tonight after dinner. Continue with NST BID. Labs wnl on admission, therefore patient is stable to be sent to antepartum floor.     Seen and discussed with XOCHITL Mcduffie attending  Brielle Shaw MD, MFM Fellow

## 2024-12-31 NOTE — CONSULT NOTE ADULT - ATTENDING COMMENTS
MFM Attending Note  Patient seen at bedside  Patient reports feeling well and without complaint  Patient states normal fetal movement and denies symptoms of labor    Patient history significant for 3 prior vaginal deliveries- last  11 pounds with should dystocia and longer term sequela; one of the prior deliveries was complicated by hypertensive disorder. She denies prior surgical history.   She was recently diagnosed with GDM and recommended to start metformin but hadn't yet started. The glucose she was reporting to the diabetes seem appeared not well controlled. Patient sent for evaluation secondary to concern of needing insulin and/or additional glycemic control.    ICU Vital Signs Last 24 Hrs  T(C): 36.6 (31 Dec 2024 16:56), Max: 36.6 (31 Dec 2024 16:22)  T(F): 97.88 (31 Dec 2024 16:56), Max: 97.9 (31 Dec 2024 16:22)  HR: 85 (31 Dec 2024 16:56) (85 - 85)  BP: 107/61 (31 Dec 2024 16:56) (107/61 - 107/61)  BP(mean): --  ABP: --  ABP(mean): --  RR: 16 (31 Dec 2024 16:22) (16 - 16)  SpO2: --    Abdomen: soft, nontender, gravid  Extremities: no calf tenderness    , moderate variability, + accel, -decel  toco rare                          12.7   5.63  )-----------( 236      ( 31 Dec 2024 16:00 )             37.6   12    137  |  104  |  6[L]  ----------------------------<  78  4.1   |  21[L]  |  0.47[L]    Ca    8.6      31 Dec 2024 16:00    TPro  7.3  /  Alb  3.8  /  TBili  0.4  /  DBili  x   /  AST  12  /  ALT  12  /  AlkPhos  135[H]  12-31    a/p:  29 y.o.  at 34w3d  # GDM, unknown glycemic control  # 3 prior vaginal deliveries    GDM: sent baseline labs appear normal without signs of DKA; will start insulin 6 U with meals and likely will start lantus 25-30 U at night; will monitor pre and postmeal glucose levels to optimize care    34 weeks: NST BID; growth today 6 lb 1 oz (81%, AC 90%)    3 prior vaginal deliveries: cephalic today; consider sending GBS since late      Continued in hospital care secondary to needing improved glycemic control. Patient currently without signs of DKA and will start insulin titration to optimize maternal/fetal outcome since late . Patient aware of need to have glucose control and aware will need to stay until at least 24 hours to determine glycemic control- patient willing to stay. Patient living in a shelter so may need to improve access to insulin by completing prescription doses being filled prior to leaving hospital.

## 2024-12-31 NOTE — CONSULT NOTE ADULT - SUBJECTIVE AND OBJECTIVE BOX
MFM Consult    HPI:  Subjective  Uzbek telephone  # [phone] (Geovani) - per patient she speaks both Uzbek and Malinke.   30yo  at 34w3d, SINTIA 24, sent in from outpatient clinic at [place] for poorly controlled GDM and issues with medication and FS paneling compliance. She was previously prescribed metformin last week but the patient did not pick it up. She lives in a shelter with her family with limited access to transportation. She has only recently started checking finger sticks over the past week: fasting 101-192, post breakfast 118-200 (6/7 elevated), post lunch 106-199 (4/7 elevations), post dinner 145-205. The patient was sent for admission for glycemic control. Ob history includes FT NSVDx3 (all in [place]) with last delivery in  complicated by shoulder dystocia and baby had left hand paralysis and was 11lbs at birth. Patient denied h/o GDM in any of prior pregnancies. Reported elevated blood pressures in second pregnancy. Denied being discharged with anti-hypertensive medications. The patient is currently asymptomatic. Denied fever, chills, nausea, vomiting. Denied ctx, vb, lof. Reports good fetal movement. FS was 79 on presentation. NST reactive. ATU sono today  cephalic, EFW 2741g (81%), AC 90%, SAMIR 15.69cm, BPP 8/8. Labs upon arrival were wnl.     Name of OB: [facility]     - ATU (): vtx, anterior, SAMIR 15.69, MVP 5.38, BPP 8/8, EFW 2741g (81%), AC 90%     - GDMA2  GynHx: denies  PMH: denies  PSH: denies  Meds: none  Allergies: NKDA  Social: denies substance use; lives in shelter with her 3 children and her   Will accept blood transfusions? Yes   (31 Dec 2024 16:56)        T(F): 97.88 (24 @ 16:56), Max: 97.9 (24 @ 16:22)  HR: 85 (24 @ 16:56) (85 - 85)  BP: 107/61 (24 @ 16:56) (107/61 - 107/61)  RR: 16 (24 @ 16:22) ( - )    General:  In no apparent distress  Abdomen:  Soft, nontender, nondistended, no rebound, no guarding.    Non stress test:     LABORATORY:                        12.7   5.63  )-----------( 236      ( 31 Dec 2024 16:00 )             37.6         137  |  104  |  6[L]  ----------------------------<  78  4.1   |  21[L]  |  0.47[L]    Ca    8.6      31 Dec 2024 16:00    TPro  7.3  /  Alb  3.8  /  TBili  0.4  /  DBili  x   /  AST  12  /  ALT  12  /  AlkPhos  135[H]        Urinalysis Basic - ( 31 Dec 2024 16:37 )    Color: Yellow / Appearance: Clear / S.015 / pH: x  Gluc: x / Ketone: Negative mg/dL  / Bili: Negative / Urobili: 0.2 mg/dL   Blood: x / Protein: Negative mg/dL / Nitrite: Negative   Leuk Esterase: Trace / RBC: 0 /HPF / WBC 2 /HPF   Sq Epi: x / Non Sq Epi: 12 /HPF / Bacteria: Few /HPF

## 2024-12-31 NOTE — OB PROVIDER H&P - HISTORY OF PRESENT ILLNESS
R3 [facility] Admission    Subjective  HPI: 28yo  at 34w3d with prenatal course c/b GDMA2 (not taking prescribed Metformin) sent to L&D from [facility] office due to persistent hyperglycemia with need for inpatient glucose control. Patient was prescribed Metformin but has not been to the pharmacy to  the prescription. She denies barriers to taking the medication but notably lives in a shelter with her family and does not have reliable transportation.     On review of finger stick blood glucose today,     Current Medications: Metformin ER 500mg qHS, patient has not initiated or picked up from pharmacy.   FB to 192mg/dL FBG in office today was 192mg/dL patient reports only having water this morning.   140mg/dl yesterday,   1hr PPB to 205mg/dL 16x elevations in the past week.   Breakfast 118 to 200mg/dL 6 of 7 values elevated ( 200, 176, 154, 177, 160, 190mg/dL)   Lunch: 98-199mg/dL (3 elevations of 199, 168 and 153mg/dL)  Dinner: 145-205mg/dL all elevated (180. 158, 205, 250, 176, 146 and 145mg/dL)  Last u/s was   FGU completed today EFW 81% AC 90% SAMIR WNL  Patent seen with Dr. Moran, and case d/w [facility]. Admission for glucose management/education/instruction/   was recommended and patient agreed, patient was transported to [facility] via EMS following todays visit.      Patient denies VB, LOF, CTX. She has good FM. Denies abdominal pain, nausea, vomiting, headache, vomiting, dizziness, lightheadedness, CP, SOB.    Name of OB: [facility]     - ATU (): vtx, anterior, SAMIR 15.69, MVP 5.38, BPP 8/8, EFW 2741g (81%), AC 90%    PNC:     - GDMA2     - EFW _g by blanca.  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 Yes      Objective  Vital Signs Last 24 Hrs  T(C): --  T(F): --  HR: --  BP: --  BP(mean): --  RR: --  SpO2: --        PE:   CV: clinically well perfused  Pulm: breathing comfortably on RA  Abd: gravid, nontender  Extr: moving all extremities with ease     Spec: pooling, nitrazine, ferning, bleeding,  (lesions if patient with HSV2 history)  VE: //    FHT: baseline 1**, mod variability, +accels, -decels  [place]: q*min  Sono: vertex                          12.7   5.63  )-----------( 236      ( 31 Dec 2024 16:00 )             37.6             Urinalysis Basic - ( 31 Dec 2024 16:37 )    Color: Yellow / Appearance: Clear / S.015 / pH: x  Gluc: x / Ketone: Negative mg/dL  / Bili: Negative / Urobili: 0.2 mg/dL   Blood: x / Protein: Negative mg/dL / Nitrite: Negative   Leuk Esterase: Trace / RBC: x / WBC x   Sq Epi: x / Non Sq Epi: x / Bacteria: x        Assessment  28yo G*P* at *w*d presents for __.     Plan        D/w attending *,    Miroslava Lopez, PGY3 R3 [facility] Admission    Subjective  HPI: 30yo  at 34w3d with prenatal course c/b GDMA2 (not taking prescribed Metformin) sent to L&D from [facility] office due to persistent hyperglycemia with need for inpatient glucose control. Patient was prescribed Metformin 500 mg QHS but has not been to the pharmacy to  the prescription. She denies barriers to taking the medication but notably lives in a shelter with her family and does not have reliable transportation.     FS in office 192 (fasting per patient)  On review of finger stick blood glucose today,   Fastin - 192  1h PP: 98 - 205, 16x elevations in the past week.       Breakfast 118-200mg/dL 6 of 7 values elevated ( 200, 176, 154, 177, 160, 190mg/dL)       Lunch: 98-199mg/dL (3 elevations of 199, 168 and 153mg/dL)      Dinner: 145-205mg/dL all elevated (180. 158, 205, 250, 176, 146 and 145mg/dL)    Patient denies VB, LOF, CTX. She has good FM. Denies abdominal pain, nausea, vomiting, headache, vomiting, dizziness, lightheadedness, CP, SOB.    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 Yes   R3 Antepartum Admission    Subjective  HPI: 28yo  at 34w3d with prenatal course c/b GDMA2 (not taking prescribed Metformin) sent to L&D from [facility] office due to persistent hyperglycemia with need for inpatient glucose control. Patient was prescribed Metformin 500 mg QHS but has not been to the pharmacy to  the prescription. She denies barriers to taking the medication but notably lives in a shelter with her family and does not have reliable transportation.     FS in office today: 192 (fasting per patient)    On review of finger stick blood glucose from the past week,  Fastin - 192  1h PP: 98 - 205, 16x elevations in the past week.       Breakfast 118-200mg/dL 6 of 7 values elevated ( 200, 176, 154, 177, 160, 190mg/dL)       Lunch: 98-199mg/dL (3 elevations of 199, 168 and 153mg/dL)      Dinner: 145-205mg/dL all elevated (180, 158, 205, 250, 176, 146 and 145mg/dL)    Patient denies VB, LOF, CTX. She has good FM. Denies abdominal pain, nausea, vomiting, headache, vomiting, dizziness, lightheadedness, CP, SOB.    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 Yes   R3 Antepartum Admission  Hungarian  #[phone] (Geovani)    Subjective  HPI: 28yo  at 34w3d with prenatal course c/b GDMA2 (not taking prescribed Metformin) sent to L&D from [facility] office due to persistent hyperglycemia with need for inpatient glucose control. Patient was prescribed Metformin 500 mg QHS but has not been to the pharmacy to  the prescription. She denies barriers to taking the medication but notably lives in a shelter with her family and does not have reliable transportation.     FS in office today: 192 (fasting per patient)    On review of finger stick blood glucose from the past week,  Fastin - 192  1h PP: 98 - 205, 16x elevations in the past week.       Breakfast 118-200mg/dL 6 of 7 values elevated ( 200, 176, 154, 177, 160, 190mg/dL)       Lunch: 98-199mg/dL (3 elevations of 199, 168 and 153mg/dL)      Dinner: 145-205mg/dL all elevated (180, 158, 205, 250, 176, 146 and 145mg/dL)    Patient denies VB, LOF, CTX. She has good FM. Denies abdominal pain, nausea, vomiting, headache, vomiting, dizziness, lightheadedness, CP, SOB.    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 Yes

## 2024-12-31 NOTE — OB PROVIDER H&P - NSHPLABSRESULTS_GEN_ALL_CORE
.                        12.7   5.63  )-----------( 236      ( 31 Dec 2024 16:00 )             37.6             Urinalysis Basic - ( 31 Dec 2024 16:37 )    Color: Yellow / Appearance: Clear / S.015 / pH: x  Gluc: x / Ketone: Negative mg/dL  / Bili: Negative / Urobili: 0.2 mg/dL   Blood: x / Protein: Negative mg/dL / Nitrite: Negative   Leuk Esterase: Trace / RBC: x / WBC x   Sq Epi: x / Non Sq Epi: x / Bacteria: x

## 2024-12-31 NOTE — OB PROVIDER H&P - NSHPPHYSICALEXAM_GEN_ALL_CORE
Objective  Vital Signs Last 24 Hrs  T(C): --  T(F): --  HR: --  BP: --  BP(mean): --  RR: --  SpO2: --      PE:   CV: clinically well perfused  Pulm: breathing comfortably on RA  Abd: gravid, nontender  Extr: moving all extremities with ease     NST: reactive  [place]: irregular ctx ICU Vital Signs Last 24 Hrs  T(C): 36.6 (31 Dec 2024 16:56), Max: 36.6 (31 Dec 2024 16:22)  T(F): 97.88 (31 Dec 2024 16:56), Max: 97.9 (31 Dec 2024 16:22)  HR: 85 (31 Dec 2024 16:56) (85 - 85)  BP: 107/61 (31 Dec 2024 16:56) (107/61 - 107/61)  BP(mean): --  ABP: --  ABP(mean): --  RR: 16 (31 Dec 2024 16:22) (16 - 16)  SpO2: --      PE:   CV: clinically well perfused  Pulm: breathing comfortably on RA  Abd: gravid, nontender  Extr: moving all extremities with ease     NST: reactive  [place]: irregular ctx

## 2024-12-31 NOTE — OB PROVIDER H&P - ASSESSMENT
A/P: 28yo  at 34w3d with prenatal course c/b GDMA2 (not taking prescribed Metformin) sent to L&D from [facility] office due to persistent hyperglycemia with need for inpatient glucose control. No acute concern for DKA. Fetal status reassuring.    #GDMA2  - Outpatient regimen: Metformin 500 mg QHS (not taking)  - Will start Admelog  with meals and Lantus 25u QHS  - Labs reviewed with no concern for DKA  - CC-Reg  - FS pre/post meal  - Nutrition consult for GDM    #Fetal Wellbeing  - ATU (): vtx, anterior, SAMIR 15.69, MVP 5.38, BPP 8/8, EFW 2741g (81%), AC 90%  - NST BID, BPP 2x/wk    #Maternal wellbeing  - Reg-CC  - HSQ for DVT ppx  - PNV  - f/u UCx  - T&S q3d while inpatient    D/w attending Miroslava Russell PGY3 A/P: 30yo  at 34w3d with prenatal course c/b GDMA2 (not taking prescribed Metformin) sent to L&D from [facility] office due to persistent hyperglycemia with need for inpatient glucose control. No acute concern for DKA. Fetal status reassuring.    #GDMA2  - Outpatient regimen: Metformin 500 mg QHS (not taking)  - Will start Admelog  with meals and Lantus 25u QHS, per MFM  - Labs reviewed with no concern for DKA  - CC-Reg  - FS pre/post meal  - Nutrition consult for GDM    #Fetal Wellbeing  - ATU (): vtx, anterior, SAMIR 15.69, MVP 5.38, BPP 8/8, EFW 2741g (81%), AC 90%  - NST BID, BPP 2x/wk    #Maternal wellbeing  - Reg-CC  - HSQ for DVT ppx  - PNV  - f/u UCx  - T&S q3d while inpatient    Seen and d/w Dr. Cook and Dr. Shaw  D/w attending Miroslava Russell PGY3

## 2024-12-31 NOTE — CONSULT NOTE ADULT - TIME BILLING
Patient care required review of chart (vitals, labs, imaged, documentation, etc), evaluation/counseling patient, and coordination of care.

## 2025-01-01 LAB
A1C WITH ESTIMATED AVERAGE GLUCOSE RESULT: 5.7 % — HIGH (ref 4–5.6)
ESTIMATED AVERAGE GLUCOSE: 117 — SIGNIFICANT CHANGE UP
T PALLIDUM AB TITR SER: NEGATIVE — SIGNIFICANT CHANGE UP

## 2025-01-01 PROCEDURE — 59025 FETAL NON-STRESS TEST: CPT | Mod: 26

## 2025-01-01 PROCEDURE — 99233 SBSQ HOSP IP/OBS HIGH 50: CPT | Mod: 25

## 2025-01-01 RX ORDER — INSULIN LISPRO 100/ML
4 VIAL (ML) SUBCUTANEOUS
Refills: 0 | Status: DISCONTINUED | OUTPATIENT
Start: 2025-01-01 | End: 2025-01-01

## 2025-01-01 RX ORDER — INSULIN GLARGINE-YFGN 100 [IU]/ML
25 INJECTION, SOLUTION SUBCUTANEOUS AT BEDTIME
Refills: 0 | Status: DISCONTINUED | OUTPATIENT
Start: 2025-01-01 | End: 2025-01-03

## 2025-01-01 RX ORDER — INSULIN LISPRO 100/ML
6 VIAL (ML) SUBCUTANEOUS
Refills: 0 | Status: DISCONTINUED | OUTPATIENT
Start: 2025-01-01 | End: 2025-01-03

## 2025-01-01 RX ADMIN — HEPARIN SODIUM 5000 UNIT(S): 1000 INJECTION, SOLUTION INTRAVENOUS; SUBCUTANEOUS at 17:16

## 2025-01-01 RX ADMIN — Medication 4 UNIT(S): at 17:29

## 2025-01-01 RX ADMIN — Medication 1 TABLET(S): at 17:16

## 2025-01-01 RX ADMIN — Medication 4 UNIT(S): at 12:32

## 2025-01-01 RX ADMIN — INSULIN GLARGINE-YFGN 25 UNIT(S): 100 INJECTION, SOLUTION SUBCUTANEOUS at 22:21

## 2025-01-01 RX ADMIN — HEPARIN SODIUM 5000 UNIT(S): 1000 INJECTION, SOLUTION INTRAVENOUS; SUBCUTANEOUS at 06:41

## 2025-01-01 NOTE — PROVIDER CONTACT NOTE (MEDICATION) - SITUATION
After pt education performed regarding insulin administration, pt stated she will not be able to self administer insulin.

## 2025-01-01 NOTE — PROVIDER CONTACT NOTE (MEDICATION) - BACKGROUND
34.4 week IUP admitted for glycemic control. Malinki speaking which is not available on  I pad. Pt speaks Nepali as second language.

## 2025-01-01 NOTE — PROVIDER CONTACT NOTE (MEDICATION) - ASSESSMENT
Pt states she understands need for insulin and why it is important for baby but has no family of friend that can administer insulin for her.

## 2025-01-01 NOTE — PROGRESS NOTE ADULT - SUBJECTIVE AND OBJECTIVE BOX
R3 Antepartum Note,       Patient seen and examined at bedside, no acute overnight events. No acute complaints. Pt reports +FM, denies LOF, VB, ctx, HA, epigastric pain, blurred vision, CP, SOB, N/V, fevers, and chills.    Vital Signs Last 24 Hours  T(C): 37.2 (01-01-25 @ 01:23), Max: 37.2 (01-01-25 @ 01:23)  HR: 80 (01-01-25 @ 01:23) (77 - 88)  BP: 111/61 (01-01-25 @ 01:23) (107/61 - 115/55)  RR: 15 (12-31-24 @ 21:19) (15 - 16)  SpO2: 99% (01-01-25 @ 01:22) (99% - 99%)    CAPILLARY BLOOD GLUCOSE      POCT Blood Glucose.: 114 mg/dL (31 Dec 2024 22:33)  POCT Blood Glucose.: 136 mg/dL (31 Dec 2024 21:22)  POCT Blood Glucose.: 77 mg/dL (31 Dec 2024 19:53)  POCT Blood Glucose.: 113 mg/dL (31 Dec 2024 18:03)  POCT Blood Glucose.: 79 mg/dL (31 Dec 2024 15:57)      Physical Exam:  General: NAD  Abdomen: Soft, non-tender, gravid  Ext: No pain or swelling    Labs:             12.7   5.63  )-----------( 236      ( 12-31 @ 16:00 )             37.6     12-31 @ 16:00    137  |  104  |  6   ----------------------------<  78  4.1   |  21  |  0.47    Ca    8.6      12-31 @ 16:00    TPro  7.3  /  Alb  3.8  /  TBili  0.4  /  DBili  x   /  AST  12  /  ALT  12  /  AlkPhos  135  12-31 @ 16:00            MEDICATIONS  (STANDING):  dextrose 5%. 1000 milliLiter(s) (100 mL/Hr) IV Continuous <Continuous>  dextrose 5%. 1000 milliLiter(s) (50 mL/Hr) IV Continuous <Continuous>  dextrose 50% Injectable 25 Gram(s) IV Push once  dextrose 50% Injectable 12.5 Gram(s) IV Push once  dextrose 50% Injectable 25 Gram(s) IV Push once  glucagon  Injectable 1 milliGRAM(s) IntraMuscular once  heparin   Injectable 5000 Unit(s) SubCutaneous every 12 hours  insulin glargine Injectable (LANTUS) 20 Unit(s) SubCutaneous at bedtime  prenatal multivitamin 1 Tablet(s) Oral daily    MEDICATIONS  (PRN):  dextrose Oral Gel 15 Gram(s) Oral once PRN Blood Glucose LESS THAN 70 milliGRAM(s)/deciliter   R3 Antepartum Note    Patient seen and examined at bedside, no acute overnight events. No acute complaints. Pt reports +FM, denies LOF, VB, ctx, HA, epigastric pain, blurred vision, CP, SOB, N/V, fevers, and chills.    Vital Signs Last 24 Hours  T(C): 37.2 (01-01-25 @ 01:23), Max: 37.2 (01-01-25 @ 01:23)  HR: 80 (01-01-25 @ 01:23) (77 - 88)  BP: 111/61 (01-01-25 @ 01:23) (107/61 - 115/55)  RR: 15 (12-31-24 @ 21:19) (15 - 16)  SpO2: 99% (01-01-25 @ 01:22) (99% - 99%)    CAPILLARY BLOOD GLUCOSE      POCT Blood Glucose.: 114 mg/dL (31 Dec 2024 22:33)  POCT Blood Glucose.: 136 mg/dL (31 Dec 2024 21:22)  POCT Blood Glucose.: 77 mg/dL (31 Dec 2024 19:53)  POCT Blood Glucose.: 113 mg/dL (31 Dec 2024 18:03)  POCT Blood Glucose.: 79 mg/dL (31 Dec 2024 15:57)      Physical Exam:  General: NAD  Abdomen: Soft, non-tender, gravid  Ext: No pain or swelling    Labs:             12.7   5.63  )-----------( 236      ( 12-31 @ 16:00 )             37.6     12-31 @ 16:00    137  |  104  |  6   ----------------------------<  78  4.1   |  21  |  0.47    Ca    8.6      12-31 @ 16:00    TPro  7.3  /  Alb  3.8  /  TBili  0.4  /  DBili  x   /  AST  12  /  ALT  12  /  AlkPhos  135  12-31 @ 16:00            MEDICATIONS  (STANDING):  dextrose 5%. 1000 milliLiter(s) (100 mL/Hr) IV Continuous <Continuous>  dextrose 5%. 1000 milliLiter(s) (50 mL/Hr) IV Continuous <Continuous>  dextrose 50% Injectable 25 Gram(s) IV Push once  dextrose 50% Injectable 12.5 Gram(s) IV Push once  dextrose 50% Injectable 25 Gram(s) IV Push once  glucagon  Injectable 1 milliGRAM(s) IntraMuscular once  heparin   Injectable 5000 Unit(s) SubCutaneous every 12 hours  insulin glargine Injectable (LANTUS) 20 Unit(s) SubCutaneous at bedtime  prenatal multivitamin 1 Tablet(s) Oral daily    MEDICATIONS  (PRN):  dextrose Oral Gel 15 Gram(s) Oral once PRN Blood Glucose LESS THAN 70 milliGRAM(s)/deciliter

## 2025-01-01 NOTE — PROGRESS NOTE ADULT - ASSESSMENT
30yo  at 34w3d, SINTIA 24, with poorly controlled GDM not currently on medications, admitted for assistance with glycemic control. Weight based dosing calculated to be approx 35u qhs and Lispro 8u ac; however with fingersticks during the day, patient started on Lantus 20u qhs. Will continue paneling fingersticks.    #GDMA2  - Lantus 20u qhs  - fasting, pre and post-prandial fingersticks  - carb-consistent diet      #Fetal wellbeing  - NST BID  - ATU (): EFW 2741g (81%), AC 90%    #Maternal wellbeing  - Regular diabetic diet  - HSQ/SCDs for DVT ppx  - PNV/Iron/Colace/Folic acid      Darnell Griffin, PGY3

## 2025-01-01 NOTE — PROGRESS NOTE ADULT - ATTENDING COMMENTS
XOCHITL Attending Note  Parth interpretor #[id number]  Patient seen at bedside  Patient reports feels well   Patient denies symptoms of labor and normal fetal movements    ICU Vital Signs Last 24 Hrs  T(C): 36.7 (2025 09:11), Max: 37.2 (2025 01:23)  T(F): 98.1 (2025 09:11), Max: 99 (2025 01:23)  HR: 85 (2025 09:12) (77 - 88)  BP: 111/58 (2025 09:12) (106/55 - 115/55)  BP(mean): --  ABP: --  ABP(mean): --  RR: 16 (2025 09:11) (15 - 16)  SpO2: 99% (2025 09:11) (99% - 100%)    O2 Parameters below as of 2025 09:11  Patient On (Oxygen Delivery Method): room air    Abdomen: soft, nontender, gravid     moderate variability, + accel, -decel  toco irregular                          12.7   5.63  )-----------( 236      ( 31 Dec 2024 16:00 )             37.6   12-    137  |  104  |  6[L]  ----------------------------<  78  4.1   |  21[L]  |  0.47[L]    Ca    8.6      31 Dec 2024 16:00    TPro  7.3  /  Alb  3.8  /  TBili  0.4  /  DBili  x   /  AST  12  /  ALT  12  /  AlkPhos  135[H]  12-31    a/p:  29 y.o.  at 34w4d  # GDM, insulin titration in process  # Prior pregnancy with LGA fetus, shoulder dystocia  # Limited prenatal care  # Living in shelter/SDoH complexity    GDM: currently on lantus 20 U QHS; did not give meal coverage for breakfast will give 4 U short acting with lunch/dinner; appear of need to continue glucose monitoring; HgbA1c 5.7% so suspect that this regimen will be effective for her- no signs of DKA; patient aware of needs for improving diabetes control and benefits to this fetus/; continue twice daily NST    SDoH complexity: social work following and assisting with care related to transportation/ resources for other children; also will confirm shelter location; patient able to understand  but her primary language is Malinke-  unavailable, if possible to obtain may have improvement in patient's understanding/communication    Patient understanding of need to stay in hospital secondary to GDM and insulin titration. Will assist patient with resources while in hospital and attempt to assist with other services.

## 2025-01-02 ENCOUNTER — TRANSCRIPTION ENCOUNTER (OUTPATIENT)
Age: 30
End: 2025-01-02

## 2025-01-02 ENCOUNTER — NON-APPOINTMENT (OUTPATIENT)
Age: 30
End: 2025-01-02

## 2025-01-02 LAB
CULTURE RESULTS: SIGNIFICANT CHANGE UP
SPECIMEN SOURCE: SIGNIFICANT CHANGE UP

## 2025-01-02 PROCEDURE — 59025 FETAL NON-STRESS TEST: CPT | Mod: 26

## 2025-01-02 PROCEDURE — 99233 SBSQ HOSP IP/OBS HIGH 50: CPT | Mod: 25

## 2025-01-02 RX ORDER — INSULIN LISPRO 100/ML
6 VIAL (ML) SUBCUTANEOUS
Qty: 1 | Refills: 0
Start: 2025-01-02 | End: 2025-01-31

## 2025-01-02 RX ORDER — INSULIN GLARGINE-YFGN 100 [IU]/ML
25 INJECTION, SOLUTION SUBCUTANEOUS
Qty: 1 | Refills: 0
Start: 2025-01-02 | End: 2025-01-31

## 2025-01-02 RX ORDER — PNV/FERROUS FUM/DOCUSATE/FOLIC 90-50-1MG
1 TABLET, EXTENDED RELEASE ORAL
Qty: 0 | Refills: 0 | DISCHARGE
Start: 2025-01-02

## 2025-01-02 RX ORDER — ISOPROPYL ALCOHOL, BENZOCAINE .7; .06 ML/ML; ML/ML
0 SWAB TOPICAL
Qty: 1 | Refills: 0
Start: 2025-01-02

## 2025-01-02 RX ADMIN — Medication 6 UNIT(S): at 17:15

## 2025-01-02 RX ADMIN — Medication 6 UNIT(S): at 08:18

## 2025-01-02 RX ADMIN — HEPARIN SODIUM 5000 UNIT(S): 1000 INJECTION, SOLUTION INTRAVENOUS; SUBCUTANEOUS at 10:34

## 2025-01-02 RX ADMIN — HEPARIN SODIUM 5000 UNIT(S): 1000 INJECTION, SOLUTION INTRAVENOUS; SUBCUTANEOUS at 23:10

## 2025-01-02 RX ADMIN — Medication 1 TABLET(S): at 13:29

## 2025-01-02 RX ADMIN — Medication 6 UNIT(S): at 12:33

## 2025-01-02 RX ADMIN — INSULIN GLARGINE-YFGN 25 UNIT(S): 100 INJECTION, SOLUTION SUBCUTANEOUS at 23:10

## 2025-01-02 NOTE — DISCHARGE NOTE ANTEPARTUM - FINANCIAL ASSISTANCE
[facility] provides services at a reduced cost to those who are determined to be eligible through [facility]’s financial assistance program. Information regarding [facility]’s financial assistance program can be found by going to [email or web address] or by calling [phone].

## 2025-01-02 NOTE — DISCHARGE NOTE ANTEPARTUM - NS MD DC FALL RISK RISK
For information on Fall & Injury Prevention, visit: [email or web address] OR  [email or web address] OR  https://www.cdc.gov/steadi/patient.html

## 2025-01-02 NOTE — DISCHARGE NOTE ANTEPARTUM - CARE PLAN
1 Principal Discharge DX:	Gestational diabetes  Assessment and plan of treatment:	Please check your fingerstick in the morning before eating a meal (fasting), before each meal, and 1 hour after each meal.  -Take your insulin as prescribed: Lantus 25 units at bedtime and Admelog (Lispro) 6 units 3x/day with breakfast, lunch, and dinner.  -Continue to eat a low carb diet, with protein and vegetables included in each meal.  -You will receive a phone call to scheduled an appointment at the OB Clinic @ [facility]. Attend all appointments as scheduled   Principal Discharge DX:	Gestational diabetes  Assessment and plan of treatment:	Please check your fingerstick in the morning before eating a meal (fasting), 3x/day before each meal, and 3x/day 1 hour after each meal.  -Take your insulin as prescribed: Lantus 25 units at bedtime, and Humalog 6 units 3x/day with breakfast, lunch, and dinner.  -Continue to eat a low carb diet, with protein and vegetables included in each meal.  -Follow up in OB Clinic @ [facility] on 1/6/25 @1pm.

## 2025-01-02 NOTE — DISCHARGE NOTE ANTEPARTUM - PROVIDER TOKENS
FREE:[LAST:[OB/GYN Clinic at [facility]],PHONE:[phone]],FAX:[(   )    -],ADDRESS:[[address] [place] [address]  C-Level of Oncology Building],FOLLOWUP:[1 week]] FREE:[LAST:[OB/GYN Clinic at [facility]],PHONE:[phone]],FAX:[(   )    -],ADDRESS:[[address] [place] [address]-Level of Oncology Building],SCHEDULEDAPPT:[01/06/2025],SCHEDULEDAPPTTIME:[01:00 PM]] FREE:[LAST:[OB/GYN Clinic at [facility]],PHONE:[phone]],FAX:[(   )    -],ADDRESS:[[address] [place] [address]  C-Level of Oncology Building],SCHEDULEDAPPT:[01/09/2025],SCHEDULEDAPPTTIME:[09:00 AM]]

## 2025-01-02 NOTE — PROGRESS NOTE ADULT - SUBJECTIVE AND OBJECTIVE BOX
Antepartum Progress Note  Welsh  ID#[id number]    S: Patient seen and examined at bedside. No acute events overnight. No acute complaints. Yesterday, long discussion with patient in Welsh (Sujey  not available) regarding barriers to Insulin. Patient had told the RN she could not inject the insulin. She explained to me that it is because she does not know how. We discussed that we will teach her how to inject insulin and make sure she is comfortable before she goes home. She states she will be able to inject the insulin 4x/day after teaching. Otherwise, patient reports good fetal movement and denies LOF, VB, CTX. Denies headache, visual changes, abdominal pain, chest pain, SOB, nausea/vomiting, fevers, chills.       MEDICATIONS  (STANDING):  dextrose 5%. 1000 milliLiter(s) (100 mL/Hr) IV Continuous <Continuous>  dextrose 5%. 1000 milliLiter(s) (50 mL/Hr) IV Continuous <Continuous>  dextrose 50% Injectable 25 Gram(s) IV Push once  dextrose 50% Injectable 12.5 Gram(s) IV Push once  dextrose 50% Injectable 25 Gram(s) IV Push once  glucagon  Injectable 1 milliGRAM(s) IntraMuscular once  heparin   Injectable 5000 Unit(s) SubCutaneous every 12 hours  insulin glargine Injectable (LANTUS) 25 Unit(s) SubCutaneous at bedtime  insulin lispro Injectable (ADMELOG) 6 Unit(s) SubCutaneous three times a day before meals  prenatal multivitamin 1 Tablet(s) Oral daily      MEDICATIONS  (PRN):  dextrose Oral Gel 15 Gram(s) Oral once PRN Blood Glucose LESS THAN 70 milliGRAM(s)/deciliter        O:  Vitals:  Vital Signs Last 24 Hrs  T(C): 36.6 (02 Jan 2025 05:32), Max: 36.8 (01 Jan 2025 17:12)  T(F): 97.9 (02 Jan 2025 05:32), Max: 98.2 (01 Jan 2025 17:12)  HR: 85 (02 Jan 2025 05:32) (82 - 94)  BP: 102/56 (02 Jan 2025 05:32) (92/53 - 119/69)  BP(mean): --  RR: 16 (02 Jan 2025 05:32) (15 - 18)  SpO2: 98% (02 Jan 2025 05:32) (96% - 100%)    Parameters below as of 02 Jan 2025 05:32  Patient On (Oxygen Delivery Method): room air        Physical Exam:  General: NAD  Heart: Clinically well-perfused  Lungs: Breathing comfortably on room air  Abdomen: Soft, gravid, non-tender  Extremities: No calf edema/tenderness        Labs:             12.7   5.63  )-----------( 236      ( 12-31 @ 16:00 )             37.6     12-31 @ 16:00    137  |  104  |  6   ----------------------------<  78  4.1   |  21  |  0.47    Ca    8.6      12-31 @ 16:00    TPro  7.3  /  Alb  3.8  /  TBili  0.4  /  DBili  x   /  AST  12  /  ALT  12  /  AlkPhos  135  12-31 @ 16:00            CAPILLARY BLOOD GLUCOSE      POCT Blood Glucose.: 150 mg/dL (01 Jan 2025 21:59)  POCT Blood Glucose.: 191 mg/dL (01 Jan 2025 18:32)  POCT Blood Glucose.: 110 mg/dL (01 Jan 2025 17:24)  POCT Blood Glucose.: 152 mg/dL (01 Jan 2025 13:43)  POCT Blood Glucose.: 88 mg/dL (01 Jan 2025 12:26)  POCT Blood Glucose.: 157 mg/dL (01 Jan 2025 09:33)  POCT Blood Glucose.: 79 mg/dL (01 Jan 2025 08:08)

## 2025-01-02 NOTE — PROGRESS NOTE ADULT - TIME BILLING
Patient care required review of chart (vitals, labs, imaged, documentation, etc), evaluation/counseling patient, and coordination of care.
Patient care required review of chart (vitals, labs, images, documentation, etc), evaluation/counseling patient, and coordination of care.

## 2025-01-02 NOTE — DISCHARGE NOTE ANTEPARTUM - HOSPITAL COURSE
29 y.o  @ 34w5d was sent from outpatient clinic at [place] due to poorly controlled GDMA2 and issues with medication compliance due to social factors. Patient is Serbian and Malinke speaking, currently living in a shelter with her family with limited access to transportation. Patient was recently prescribed Metformin last week but was unable to  prescription, however started checking her blood glucose with elevated fasting (100s-190s) and postprandial (100s-200s) values. Patient was started on Lantus 20u @HS, with eventual addition of Admelog 4u with meals. Postprandial glucose values remained elevated. Insulin increased to Lantus 25u @ HS, and Admelog 6u w/ meals. Insulin self-injection education provied. Email was sent to scheduled patient for appointment in [facility] for continued prenatal care. Discharged home on 25.    ATU : vtx, anterior, SAMIR 15.7, BPP 8/8, EFW 2741g (81%, AC 90%)   29 y.o  @ 34w5d was sent from outpatient clinic at [place] due to poorly controlled GDMA2 and issues with medication compliance due to social factors. Patient is Italian and Malinke speaking, currently living in a shelter with her family with limited access to transportation. Patient was recently prescribed Metformin last week but was unable to  prescription, however started checking her blood glucose with elevated fasting (100s-190s) and postprandial (100s-200s) values. Patient was started on Lantus 20u @HS, with eventual addition of Admelog 4u with meals. Postprandial glucose values remained elevated. Insulin increased to Lantus 25u @ HS, and Admelog 6u w/ meals. Insulin self-injection education provided. Email was sent to scheduled patient for appointment in [facility] for continued prenatal care. Discharged home on 1/3/25.    ATU : vtx, anterior, SAMIR 15.7, BPP 8/8, EFW 2741g (81%, AC 90%)

## 2025-01-02 NOTE — DISCHARGE NOTE ANTEPARTUM - PATIENT PORTAL LINK FT
You can access the FollowMyHealth Patient Portal offered by [facility] by registering at the following website: [email or web address]. By joining [employer]’s FollowMyHealth portal, you will also be able to view your health information using other applications (apps) compatible with our system.

## 2025-01-02 NOTE — DISCHARGE NOTE ANTEPARTUM - ADDITIONAL INSTRUCTIONS
You will receive a phone call to schedule an appointment with the OB Clinic at [facility] within 1 week. Please attend all appointments as scheduled.  -Check your fingerstick blood glucose as follows: Fasting (each morning before eating), 3x/day before each meal, 3x/day 1 hour after every meal.  -Take your insulin as prescribed You are scheduled for an appointment at the OB Clinic @ [facility] on 1/6/25 at 1pm. ([address] [place] [address], [place] [address])   -Check your fingerstick blood glucose as follows: Fasting (each morning before eating), 3x/day before each meal, 3x/day 1 hour after every meal.  -Take your insulin as prescribed You are scheduled for an appointment at the OB Clinic @ [facility] on 1/9/25 at 9am. ([phone] [place] [address], [place] [address])   -Check your fingerstick blood glucose as follows: Fasting (each morning before eating), 3x/day before each meal, 3x/day 1 hour after every meal.  -Take your insulin as prescribed

## 2025-01-02 NOTE — PROGRESS NOTE ADULT - ASSESSMENT
28yo  at 34w4d, SINTIA 24, with poorly controlled GDM (not taking prescribed Metformin outpatient), admitted for assistance with glycemic control. Weight based dosing calculated to be approx 35u qhs and Lispro 8u ac; however with fingersticks during the day, patient started on Lantus 20u qhs. She has since been increased Lispro 8u ac and Lantus 25u qhs.    #GDMA2  - Hgb A1c (): 5.7  - Lantus 25u qhs  - Lispro   - fasting, pre and post-prandial fingersticks  - carb-consistent diet    #Fetal wellbeing  - NST BID, BPP 2x/wk  - ATU (): EFW 2741g (81%), AC 90%    #Maternal wellbeing  - Regular diabetic diet  - HSQ for DVT ppx  - PNV  - f/u UCx  - T&S q3d while inpatient    [facility] PGY3

## 2025-01-02 NOTE — DISCHARGE NOTE ANTEPARTUM - CARE PROVIDER_API CALL
OB/GYN Clinic at [facility],   [address] [place] [address]  C-Level of Oncology Building  Phone: [phone]  Fax: (   )    -  Follow Up Time: 1 week   OB/GYN Clinic at [facility],   [address] [place] [address]  C-Level of Oncology Building  Phone: [phone]  Fax: (   )    -  Scheduled Appointment: 01/06/2025 01:00 PM   OB/GYN Clinic at [facility],   [address], [place] [address]  C-Level of Oncology Building  Phone: [phone]  Fax: (   )    -  Scheduled Appointment: 01/09/2025 09:00 AM

## 2025-01-02 NOTE — PROGRESS NOTE ADULT - ATTENDING COMMENTS
XOCHITL Attending Note  Evaluation with [place] interpretor ZE  PAtient reports feeling well today  Patient aware has been in hospital for glucose monitoring and denies symptoms   Patient reports normal fetal movement, no symptoms of labor    ICU Vital Signs Last 24 Hrs  T(C): 36.6 (2025 13:42), Max: 36.8 (2025 17:12)  T(F): 97.9 (2025 13:42), Max: 98.2 (2025 17:12)  HR: 93 (2025 13:42) (82 - 102)  BP: 107/61 (2025 13:42) (92/53 - 119/69)  BP(mean): --  ABP: --  ABP(mean): --  RR: 16 (2025 13:42) (15 - 18)  SpO2: 98% (2025 13:42) (96% - 100%)    O2 Parameters below as of 2025 13:42  Patient On (Oxygen Delivery Method): room air    Abdomen: soft, nontender, gravid    , mod variability, + accel, -decel  toco quiet                          12.7   5.63  )-----------( 236      ( 31 Dec 2024 16:00 )             37.6     a/p:  29 y.o.  at 34w5d  # GDM, insulin titration in process  # Prior pregnancy with LGA fetus, shoulder dystocia  # Limited prenatal care  # Living in shelter/SDoH complexity    GDM: currently on lantus 25 U QHS; increased to 6 U admelog with meals- appears to be in good range but may still need some titration based on outpatient glucose logs; HgbA1c 5.7% so suspect that this regimen will be effective for her- no signs of DKA; patient aware of needs for improving diabetes control and benefits to this fetus/; nursing teaching still in process for insulin/glucose monitoring    [facility] complexity: was able to utilize Juan Pabloinksuzanne interpretor today and improved communication; SW and CM assisting with transportation services- confirming safe home environment; will attempt to have insulin available prior to discharge so patient can be sent home with it; patient desires to transition care to HR at [facility] and working for appointment next week; were not able to address vaccinations today- if remains in hospital tomorrow re-address influenza/Tdap    Patient potentially for discharge later today if successful completion of insulin teaching. Patient glucose appears improved compared to preadmission. Pending safe discharge planning to determine if can be discharged late tonight or tomorrow.

## 2025-01-02 NOTE — DISCHARGE NOTE ANTEPARTUM - MEDICATION SUMMARY - MEDICATIONS TO TAKE
I will START or STAY ON the medications listed below when I get home from the hospital:    Admelog SoloStar 100 units/mL injectable solution  -- 6 unit(s) injectable 3 times a day (with meals)  -- Indication: For Gestational diabetes    Lantus Solostar Pen 100 units/mL subcutaneous solution  -- 25 unit(s) subcutaneous once a day (at bedtime)  -- Indication: For Gestational diabetes    Prenatal Multivitamins with Folic Acid 1 mg oral tablet  -- 1 tab(s) by mouth once a day  -- Indication: For Pregnancy   I will START or STAY ON the medications listed below when I get home from the hospital:    HumaLOG KwikPen 100 units/mL injectable solution  -- 6 unit(s) injectable 3 times a day (with meals)  -- Indication: For Gestational diabetes    Lantus Solostar Pen 100 units/mL subcutaneous solution  -- 25 unit(s) subcutaneous once a day (at bedtime)  -- Indication: For Gestational diabetes    Prenatal Multivitamins with Folic Acid 1 mg oral tablet  -- 1 tab(s) by mouth once a day  -- Indication: For Pregnancy   I will START or STAY ON the medications listed below when I get home from the hospital:    alcohol swabs  -- Clean injection site prior to giving insulin  -- Indication: For Gestational diabetes    insulin pen needles  -- 3x/day with meals and every night at bedtime  -- Indication: For Gestational diabetes    HumaLOG KwikPen 100 units/mL injectable solution  -- 6 unit(s) injectable 3 times a day (with meals)  -- Indication: For Gestational diabetes    Lantus Solostar Pen 100 units/mL subcutaneous solution  -- 25 unit(s) subcutaneous once a day (at bedtime)  -- Indication: For Gestational diabetes    Prenatal Multivitamins with Folic Acid 1 mg oral tablet  -- 1 tab(s) by mouth once a day  -- Indication: For Pregnancy

## 2025-01-02 NOTE — DISCHARGE NOTE ANTEPARTUM - PLAN OF CARE
Please check your fingerstick in the morning before eating a meal (fasting), before each meal, and 1 hour after each meal.  -Take your insulin as prescribed: Lantus 25 units at bedtime and Admelog (Lispro) 6 units 3x/day with breakfast, lunch, and dinner.  -Continue to eat a low carb diet, with protein and vegetables included in each meal.  -You will receive a phone call to scheduled an appointment at the OB Clinic @ [facility]. Attend all appointments as scheduled Please check your fingerstick in the morning before eating a meal (fasting), 3x/day before each meal, and 3x/day 1 hour after each meal.  -Take your insulin as prescribed: Lantus 25 units at bedtime, and Humalog 6 units 3x/day with breakfast, lunch, and dinner.  -Continue to eat a low carb diet, with protein and vegetables included in each meal.  -Follow up in OB Clinic @ [facility] on 1/6/25 @1pm.

## 2025-01-03 ENCOUNTER — APPOINTMENT (OUTPATIENT)
Dept: ANTEPARTUM | Facility: CLINIC | Age: 30
End: 2025-01-03
Payer: MEDICAID

## 2025-01-03 ENCOUNTER — ASOB RESULT (OUTPATIENT)
Age: 30
End: 2025-01-03

## 2025-01-03 VITALS
HEART RATE: 98 BPM | SYSTOLIC BLOOD PRESSURE: 107 MMHG | TEMPERATURE: 98 F | DIASTOLIC BLOOD PRESSURE: 61 MMHG | OXYGEN SATURATION: 97 % | RESPIRATION RATE: 16 BRPM

## 2025-01-03 PROCEDURE — 76819 FETAL BIOPHYS PROFIL W/O NST: CPT | Mod: 26

## 2025-01-03 PROCEDURE — 99232 SBSQ HOSP IP/OBS MODERATE 35: CPT | Mod: GC

## 2025-01-03 RX ORDER — RESPIRATORY SYNCYTIAL VIRUS VACCINE 120MCG/0.5
0.5 KIT INTRAMUSCULAR ONCE
Refills: 0 | Status: COMPLETED | OUTPATIENT
Start: 2025-01-03 | End: 2025-01-03

## 2025-01-03 RX ADMIN — Medication 6 UNIT(S): at 08:35

## 2025-01-03 RX ADMIN — HEPARIN SODIUM 5000 UNIT(S): 1000 INJECTION, SOLUTION INTRAVENOUS; SUBCUTANEOUS at 11:04

## 2025-01-03 RX ADMIN — Medication 1 TABLET(S): at 11:04

## 2025-01-03 RX ADMIN — RESPIRATORY SYNCYTIAL VIRUS VACCINE 0.5 MILLILITER(S): KIT at 15:20

## 2025-01-03 RX ADMIN — Medication 6 UNIT(S): at 12:20

## 2025-01-03 NOTE — DISCHARGE NOTE OB - FINANCIAL ASSISTANCE
[facility] provides services at a reduced cost to those who are determined to be eligible through [facility]’s financial assistance program. Information regarding [facility]’s financial assistance program can be found by going to [email or web address] or by calling [phone].

## 2025-01-03 NOTE — DISCHARGE NOTE OB - HOME CARE AGENCY TYPE OF SERVICE:
Diabetes teaching. Visiting Nurse to visit patient the day after discharge. Visiting  Nurse to call patient to schedule visit time.

## 2025-01-03 NOTE — DISCHARGE NOTE OB - PATIENT PORTAL LINK FT
You can access the FollowMyHealth Patient Portal offered by [facility] by registering at the following website: [email or web address]. By joining [employer]’s FollowMyHealth portal, you will also be able to view your health information using other applications (apps) compatible with our system.

## 2025-01-03 NOTE — DISCHARGE NOTE OB - COMMUNITY RESOURCE NAME:
[facility] Transportation [phone], [employer] ([phone]) to  patient from [facility] on 01/03/2025 at 4:00 pm. Confirmation # [phone]

## 2025-01-03 NOTE — PROGRESS NOTE ADULT - SUBJECTIVE AND OBJECTIVE BOX
Antepartum Progress Note  [facility]  ID#[id number]    S: Patient seen and examined at bedside. No acute events overnight. No acute complaints. Patient reports feeling comfortable enough with injecting insulin to go home today. She desires to follow up for remainder of PNC at [facility], but needs a new appt because she is busyon Monday. Patient reports good fetal movement and denies LOF, VB, CTX. Denies headache, visual changes, abdominal pain, chest pain, SOB, nausea/vomiting, fevers, chills.     MEDICATIONS  (STANDING):  dextrose 5%. 1000 milliLiter(s) (100 mL/Hr) IV Continuous <Continuous>  dextrose 5%. 1000 milliLiter(s) (50 mL/Hr) IV Continuous <Continuous>  dextrose 50% Injectable 25 Gram(s) IV Push once  dextrose 50% Injectable 12.5 Gram(s) IV Push once  dextrose 50% Injectable 25 Gram(s) IV Push once  glucagon  Injectable 1 milliGRAM(s) IntraMuscular once  heparin   Injectable 5000 Unit(s) SubCutaneous every 12 hours  insulin glargine Injectable (LANTUS) 25 Unit(s) SubCutaneous at bedtime  insulin lispro Injectable (ADMELOG) 6 Unit(s) SubCutaneous three times a day before meals  prenatal multivitamin 1 Tablet(s) Oral daily      MEDICATIONS  (PRN):  dextrose Oral Gel 15 Gram(s) Oral once PRN Blood Glucose LESS THAN 70 milliGRAM(s)/deciliter        O:  Vitals:  Vital Signs Last 24 Hrs  T(C): 36.7 (03 Jan 2025 05:07), Max: 37.1 (02 Jan 2025 17:29)  T(F): 98.1 (03 Jan 2025 05:07), Max: 98.8 (02 Jan 2025 17:29)  HR: 90 (03 Jan 2025 05:07) (83 - 102)  BP: 91/53 (03 Jan 2025 05:07) (91/53 - 116/60)  BP(mean): --  RR: 18 (03 Jan 2025 05:07) (16 - 18)  SpO2: 96% (03 Jan 2025 05:07) (96% - 100%)    Parameters below as of 03 Jan 2025 05:07  Patient On (Oxygen Delivery Method): room air        Physical Exam:  General: NAD  Heart: Clinically well-perfused  Lungs: Breathing comfortably on room air  Abdomen: Soft, gravid, non-tender      Labs:                CAPILLARY BLOOD GLUCOSE      POCT Blood Glucose.: 79 mg/dL (03 Jan 2025 08:07)  POCT Blood Glucose.: 97 mg/dL (02 Jan 2025 22:12)  POCT Blood Glucose.: 141 mg/dL (02 Jan 2025 18:24)  POCT Blood Glucose.: 94 mg/dL (02 Jan 2025 17:03)  POCT Blood Glucose.: 123 mg/dL (02 Jan 2025 13:41)  POCT Blood Glucose.: 90 mg/dL (02 Jan 2025 12:26)  POCT Blood Glucose.: 172 mg/dL (02 Jan 2025 09:31)

## 2025-01-03 NOTE — CHART NOTE - NSCHARTNOTEFT_GEN_A_CORE
Patient seen for requested nutrition consult for GDM education prior to discharge.    Source: ( X ) Patient    (  ) Family    (  ) RN    ( X ) Team    1/3/25 OB Resident: 30yo  at 34w6d, SINTIA 24, with poorly controlled GDM (not taking prescribed Metformin outpatient), admitted for assistance with glycemic control.   Patient with GDMA2, on Lantus/Lispro.  Patient appeared preoccupied with phone during most of encounter.  Utilized Location  ID#[id number].  Patient initially unsure of "carbohydrate", but was able to name several foods that contain "sugar".  RD provided education to patient on nutritional strategies to improve blood glucose control using the plate method; reviewed carbohydrate food sources with patient and educated on well-balanced/mixed meals i.e. including protein foods, non-starchy vegetables / fiber foods and portioned carbohydrate foods with meals.  Reviewed importance of minimizing intake of carbohydrate sweets / sugary beverages to help improve overall glycemic control.  Patient with good appetite / PO intake at this time.  No reported food allergies.  Patient would likely benefit from ongoing diabetic diet education as outpatient.    Current diet: Diet, Regular:   Consistent Carbohydrate Gestational Diabetic {3 Snacks} (CSTCHOGDM) (24 @ 17:39)      Anthropometrics:  Height: 167.6cm  Pre-pregnancy weight: 72kg  Admit/Dosing wt: 81.6kg  Weight gain: 9.6kg / 11.8%    Glucose Trend:  CAPILLARY BLOOD GLUCOSE  POCT Blood Glucose.: 79 mg/dL (2025 08:07)  POCT Blood Glucose.: 97 mg/dL (2025 22:12)  POCT Blood Glucose.: 141 mg/dL (2025 18:24)  POCT Blood Glucose.: 94 mg/dL (2025 17:03)  POCT Blood Glucose.: 123 mg/dL (2025 13:41)  POCT Blood Glucose.: 90 mg/dL (2025 12:26)      Na137 mmol/L Glu 78 mg/dL K+ 4.1 mmol/L Cr  0.47 mg/dL[L] BUN 6 mg/dL[L]  Alb 3.8 g/dL    Medications  dextrose 5%. 1000 milliLiter(s) IV Continuous <Continuous>  dextrose 5%. 1000 milliLiter(s) IV Continuous <Continuous>  dextrose 50% Injectable 25 Gram(s) IV Push once  dextrose 50% Injectable 12.5 Gram(s) IV Push once  dextrose 50% Injectable 25 Gram(s) IV Push once  dextrose Oral Gel 15 Gram(s) Oral once PRN  glucagon  Injectable 1 milliGRAM(s) IntraMuscular once  heparin   Injectable 5000 Unit(s) SubCutaneous every 12 hours  insulin glargine Injectable (LANTUS) 25 Unit(s) SubCutaneous at bedtime  insulin lispro Injectable (ADMELOG) 6 Unit(s) SubCutaneous three times a day before meals  prenatal multivitamin 1 Tablet(s) Oral daily      Nutrition Diagnosis:   ( X ) Altered Nutrition Related Lab Values (glucose) related to recent dx GDM as evidenced by admission for GDM/glycemic control / elevated POCT Glucose trend     Monitoring/Interventions:  ( X ) Trend POCT Glucose while inpatient  ( X ) Suggest outpatient follow-up as feasible with appropriate dietitian / diabetes educator upon discharge for ongoing education, monitoring and reinforcement.  (  X ) RD provided patient with diet education, including: carbohydrate food sources, appropriate portion sizes of various CHO foods, mixed meals.      Education:  Written and Verbal Education was provided to patient; handout: "Gestational Diabetes Nutrition Therapy"   ( X ) Patient able to teachback at least 2 education points.    RD remains available upon request and will follow up per protocol.      Christianne Jiménez MS, RD, CDN   Pager #[phone], also available on [facility]
Patient has been scheduled for HRC appointment for 1/6/25 @ 1:20pm, with NST/BPP scheduled for 2pm and 2:30pm.    Radha TAMEZ-BC

## 2025-01-03 NOTE — PROGRESS NOTE ADULT - NSPROGADDITIONALINFOA_GEN_ALL_CORE
MFM Fellow Addendum:    INCOMPLETE NOTE    #: ***    Patient is a 30 YO  at 34w6d with GDMA2 (HbA1c 5.7%), history of shoulder dystocia in the setting of LGA fetus (11 lbs) with resultant brachial plexus injury, and social-determinants of health impacting medical care (lives in shelter, non-English primary language) admitted to the hospital for optimization of diabetes regimen. Patient is feeling well. She reports normal fetal movement and denies symptoms of labor. Vital signs are within normal limits. Reactive NST (baseline 120 bpm, moderate variability, presence of accelerations, no decelerations); tocometry acontractile. Current GDM regimen is glargine 25U at night, lispro 6U with meals. Fingersticks are at goal. No insulin titration is indicated. Nurse teaching for insulin/glucose monitoring still ongoing. Anticipate discharge home today. Social work and  assisting in care of patient. Patient for short interval follow-up in [facility] the week of  25.    Influenza/Tdap vaccines? [facility] Fellow Addendum:    #: [id number]    Patient is a 30 YO  at 34w6d with GDMA2 (HbA1c 5.7%), history of shoulder dystocia in the setting of LGA fetus (11 lbs) with resultant brachial plexus injury, and social-determinants of health impacting medical care (lives in shelter, non-English primary language) admitted to the hospital for optimization of diabetes regimen. Patient is feeling well. She reports normal fetal movement and denies symptoms of labor. Vital signs are within normal limits. Reactive NST (baseline 120 bpm, moderate variability, presence of accelerations, no decelerations); tocometry acontractile. Current GDM regimen is glargine 25U at night, lispro 6U with meals. Fingersticks are at goal. No insulin titration is indicated. Patient now feels comfortable with insulin administration after teaching. For RSV vaccine today. For discharge home today. Social work and  assisting in care of patient. Patient for short interval follow-up in [facility] the week of 25.    Sirisha Blackmon MD  [facility] Fellow  Attg: Dr. Arzola

## 2025-01-03 NOTE — PROGRESS NOTE ADULT - ASSESSMENT
30yo  at 34w5d, SINTIA 24, with poorly controlled GDM (not taking prescribed Metformin outpatient), admitted for assistance with glycemic control. Weight based dosing calculated to be approx 35u qhs and Lispro 8u ac; however with fingersticks during the day, patient started on Lantus 20u qhs and increased to Lantus 25 and Lispro 6u with meals.    #GDMA2  - Hgb A1c (): 5.7  - Lantus 25u qhs  - Lispro   - fasting, pre and post-prandial fingersticks  - carb-consistent diet    #Fetal wellbeing  - NST BID, BPP 2x/wk  - ATU (): EFW 2741g (81%), AC 90%    #Maternal wellbeing  - Regular diabetic diet  - HSQ for DVT ppx  - PNV  - f/u UCx  - T&S q3d while inpatient    #Social Determinants of Health  - CM and SW closely following  - [facility] HRC appt to be re-scheduled    Miroslava Lopez PGY3 30yo  at 34w6d, SINTIA 24, with poorly controlled GDM (not taking prescribed Metformin outpatient), admitted for assistance with glycemic control. Weight based dosing calculated to be approx 35u qhs and Lispro 8u ac; however with fingersticks during the day, patient started on Lantus 20u qhs and increased to Lantus 25 and Lispro 6u with meals.    #GDMA2  - Hgb A1c (): 5.7  - Lantus 25u qhs  - Lispro   - fasting, pre and post-prandial fingersticks  - carb-consistent diet    #Fetal wellbeing  - NST BID, BPP 2x/wk  - ATU (): EFW 2741g (81%), AC 90%    #Maternal wellbeing  - Regular diabetic diet  - HSQ for DVT ppx  - PNV  - f/u UCx  - T&S q3d while inpatient    #Social Determinants of Health  - CM and SW closely following  - [facility] HRC appt to be re-scheduled    Miroslava Lopez PGY3

## 2025-01-06 ENCOUNTER — APPOINTMENT (OUTPATIENT)
Dept: ANTEPARTUM | Facility: HOSPITAL | Age: 30
End: 2025-01-06

## 2025-01-08 ENCOUNTER — NON-APPOINTMENT (OUTPATIENT)
Age: 30
End: 2025-01-08

## 2025-01-08 DIAGNOSIS — O99.019 ANEMIA COMPLICATING PREGNANCY, UNSPECIFIED TRIMESTER: ICD-10-CM

## 2025-01-08 DIAGNOSIS — O26.00 EXCESSIVE WEIGHT GAIN IN PREGNANCY, UNSPECIFIED TRIMESTER: ICD-10-CM

## 2025-01-09 ENCOUNTER — ASOB RESULT (OUTPATIENT)
Age: 30
End: 2025-01-09

## 2025-01-09 ENCOUNTER — APPOINTMENT (OUTPATIENT)
Dept: MATERNAL FETAL MEDICINE | Facility: HOSPITAL | Age: 30
End: 2025-01-09
Payer: MEDICAID

## 2025-01-09 ENCOUNTER — RESULT REVIEW (OUTPATIENT)
Age: 30
End: 2025-01-09

## 2025-01-09 ENCOUNTER — OUTPATIENT (OUTPATIENT)
Dept: OUTPATIENT SERVICES | Facility: HOSPITAL | Age: 30
LOS: 1 days | End: 2025-01-09

## 2025-01-09 ENCOUNTER — APPOINTMENT (OUTPATIENT)
Dept: OBGYN | Facility: HOSPITAL | Age: 30
End: 2025-01-09
Payer: MEDICAID

## 2025-01-09 ENCOUNTER — APPOINTMENT (OUTPATIENT)
Dept: MATERNAL FETAL MEDICINE | Facility: HOSPITAL | Age: 30
End: 2025-01-09

## 2025-01-09 VITALS
HEART RATE: 90 BPM | SYSTOLIC BLOOD PRESSURE: 104 MMHG | TEMPERATURE: 97.7 F | WEIGHT: 184 LBS | HEIGHT: 64 IN | DIASTOLIC BLOOD PRESSURE: 77 MMHG | BODY MASS INDEX: 31.41 KG/M2

## 2025-01-09 DIAGNOSIS — Z23 ENCOUNTER FOR IMMUNIZATION: ICD-10-CM

## 2025-01-09 DIAGNOSIS — D57.3 SICKLE-CELL TRAIT: ICD-10-CM

## 2025-01-09 DIAGNOSIS — O24.415 GESTATIONAL DIABETES MELLITUS IN PREGNANCY, CONTROLLED BY ORAL HYPOGLYCEMIC DRUGS: ICD-10-CM

## 2025-01-09 DIAGNOSIS — D50.8 OTHER IRON DEFICIENCY ANEMIAS: ICD-10-CM

## 2025-01-09 DIAGNOSIS — O24.410 GESTATIONAL DIABETES MELLITUS IN PREGNANCY, DIET CONTROLLED: ICD-10-CM

## 2025-01-09 DIAGNOSIS — R79.89 OTHER SPECIFIED ABNORMAL FINDINGS OF BLOOD CHEMISTRY: ICD-10-CM

## 2025-01-09 DIAGNOSIS — O09.93 SUPERVISION OF HIGH RISK PREGNANCY, UNSPECIFIED, THIRD TRIMESTER: ICD-10-CM

## 2025-01-09 PROCEDURE — 90656 IIV3 VACC NO PRSV 0.5 ML IM: CPT | Mod: NC

## 2025-01-09 PROCEDURE — 99213 OFFICE O/P EST LOW 20 MIN: CPT | Mod: TH,25

## 2025-01-09 PROCEDURE — 76818 FETAL BIOPHYS PROFILE W/NST: CPT | Mod: 26

## 2025-01-09 RX ORDER — INSULIN GLARGINE 100 [IU]/ML
100 INJECTION, SOLUTION SUBCUTANEOUS AT BEDTIME
Qty: 1 | Refills: 3 | Status: ACTIVE | COMMUNITY
Start: 2025-01-09 | End: 1900-01-01

## 2025-01-09 RX ORDER — METFORMIN ER 500 MG 500 MG/1
500 TABLET ORAL
Qty: 30 | Refills: 3 | Status: ACTIVE | COMMUNITY
Start: 2025-01-09 | End: 1900-01-01

## 2025-01-09 RX ORDER — INSULIN LISPRO 100 [IU]/ML
100 INJECTION, SOLUTION INTRAVENOUS; SUBCUTANEOUS
Qty: 1 | Refills: 3 | Status: ACTIVE | COMMUNITY
Start: 2025-01-09 | End: 1900-01-01

## 2025-01-10 ENCOUNTER — APPOINTMENT (OUTPATIENT)
Dept: OBGYN | Facility: CLINIC | Age: 30
End: 2025-01-10

## 2025-01-10 ENCOUNTER — NON-APPOINTMENT (OUTPATIENT)
Age: 30
End: 2025-01-10

## 2025-01-10 LAB
CULTURE RESULTS: SIGNIFICANT CHANGE UP
SPECIMEN SOURCE: SIGNIFICANT CHANGE UP

## 2025-01-13 ENCOUNTER — OUTPATIENT (OUTPATIENT)
Dept: OUTPATIENT SERVICES | Facility: HOSPITAL | Age: 30
LOS: 1 days | End: 2025-01-13

## 2025-01-13 ENCOUNTER — APPOINTMENT (OUTPATIENT)
Dept: ANTEPARTUM | Facility: HOSPITAL | Age: 30
End: 2025-01-13
Payer: MEDICAID

## 2025-01-13 ENCOUNTER — RESULT REVIEW (OUTPATIENT)
Age: 30
End: 2025-01-13

## 2025-01-13 ENCOUNTER — ASOB RESULT (OUTPATIENT)
Age: 30
End: 2025-01-13

## 2025-01-13 ENCOUNTER — APPOINTMENT (OUTPATIENT)
Dept: MATERNAL FETAL MEDICINE | Facility: HOSPITAL | Age: 30
End: 2025-01-13
Payer: MEDICAID

## 2025-01-13 VITALS
DIASTOLIC BLOOD PRESSURE: 71 MMHG | TEMPERATURE: 97.9 F | HEART RATE: 97 BPM | SYSTOLIC BLOOD PRESSURE: 92 MMHG | WEIGHT: 187 LBS | BODY MASS INDEX: 32.1 KG/M2

## 2025-01-13 DIAGNOSIS — O09.90 SUPERVISION OF HIGH RISK PREGNANCY, UNSPECIFIED, UNSPECIFIED TRIMESTER: ICD-10-CM

## 2025-01-13 PROCEDURE — 99213 OFFICE O/P EST LOW 20 MIN: CPT | Mod: TH,GC,25

## 2025-01-13 PROCEDURE — 76818 FETAL BIOPHYS PROFILE W/NST: CPT | Mod: 26

## 2025-01-13 RX ORDER — INSULIN GLARGINE 100 [IU]/ML
100 INJECTION, SOLUTION SUBCUTANEOUS
Qty: 1 | Refills: 0 | Status: ACTIVE | COMMUNITY
Start: 2025-01-13 | End: 1900-01-01

## 2025-01-14 ENCOUNTER — NON-APPOINTMENT (OUTPATIENT)
Age: 30
End: 2025-01-14

## 2025-01-14 LAB
C TRACH RRNA SPEC QL NAA+PROBE: SIGNIFICANT CHANGE UP
GROUP B BETA STREP DNA (PCR): DETECTED
N GONORRHOEA RRNA SPEC QL NAA+PROBE: SIGNIFICANT CHANGE UP
SOURCE GROUP B STREP: SIGNIFICANT CHANGE UP
SPECIMEN SOURCE: SIGNIFICANT CHANGE UP

## 2025-01-15 ENCOUNTER — NON-APPOINTMENT (OUTPATIENT)
Age: 30
End: 2025-01-15

## 2025-01-21 ENCOUNTER — APPOINTMENT (OUTPATIENT)
Dept: ANTEPARTUM | Facility: HOSPITAL | Age: 30
End: 2025-01-21
Payer: MEDICAID

## 2025-01-21 ENCOUNTER — OUTPATIENT (OUTPATIENT)
Dept: OUTPATIENT SERVICES | Facility: HOSPITAL | Age: 30
LOS: 1 days | End: 2025-01-21

## 2025-01-21 VITALS
HEIGHT: 64 IN | SYSTOLIC BLOOD PRESSURE: 110 MMHG | WEIGHT: 189 LBS | BODY MASS INDEX: 32.27 KG/M2 | DIASTOLIC BLOOD PRESSURE: 71 MMHG | TEMPERATURE: 97.4 F | HEART RATE: 75 BPM

## 2025-01-21 PROCEDURE — 99213 OFFICE O/P EST LOW 20 MIN: CPT | Mod: TH,GC,25

## 2025-01-23 ENCOUNTER — INPATIENT (INPATIENT)
Facility: HOSPITAL | Age: 30
LOS: 2 days | Discharge: HOME | End: 2025-01-26
Attending: SPECIALIST | Admitting: SPECIALIST
Payer: MEDICAID

## 2025-01-23 ENCOUNTER — ASOB RESULT (OUTPATIENT)
Age: 30
End: 2025-01-23

## 2025-01-23 ENCOUNTER — APPOINTMENT (OUTPATIENT)
Dept: MATERNAL FETAL MEDICINE | Facility: HOSPITAL | Age: 30
End: 2025-01-23
Payer: MEDICAID

## 2025-01-23 VITALS — DIASTOLIC BLOOD PRESSURE: 69 MMHG | SYSTOLIC BLOOD PRESSURE: 116 MMHG | HEART RATE: 91 BPM

## 2025-01-23 DIAGNOSIS — O24.419 GESTATIONAL DIABETES MELLITUS IN PREGNANCY, UNSPECIFIED CONTROL: ICD-10-CM

## 2025-01-23 DIAGNOSIS — O09.93 SUPERVISION OF HIGH RISK PREGNANCY, UNSPECIFIED, THIRD TRIMESTER: ICD-10-CM

## 2025-01-23 DIAGNOSIS — Z87.59 PERSONAL HISTORY OF OTHER COMPLICATIONS OF PREGNANCY, CHILDBIRTH AND THE PUERPERIUM: ICD-10-CM

## 2025-01-23 LAB
BASOPHILS # BLD AUTO: 0.02 K/UL — SIGNIFICANT CHANGE UP (ref 0–0.2)
BASOPHILS NFR BLD AUTO: 0.3 % — SIGNIFICANT CHANGE UP (ref 0–2)
BLD GP AB SCN SERPL QL: NEGATIVE — SIGNIFICANT CHANGE UP
EOSINOPHIL # BLD AUTO: 0.06 K/UL — SIGNIFICANT CHANGE UP (ref 0–0.5)
EOSINOPHIL NFR BLD AUTO: 0.9 % — SIGNIFICANT CHANGE UP (ref 0–6)
HCT VFR BLD CALC: 37.2 % — SIGNIFICANT CHANGE UP (ref 34.5–45)
HGB BLD-MCNC: 12.4 G/DL — SIGNIFICANT CHANGE UP (ref 11.5–15.5)
IANC: 4.41 K/UL — SIGNIFICANT CHANGE UP (ref 1.8–7.4)
IMM GRANULOCYTES NFR BLD AUTO: 0.8 % — SIGNIFICANT CHANGE UP (ref 0–0.9)
LYMPHOCYTES # BLD AUTO: 1.6 K/UL — SIGNIFICANT CHANGE UP (ref 1–3.3)
LYMPHOCYTES # BLD AUTO: 24.2 % — SIGNIFICANT CHANGE UP (ref 13–44)
MCHC RBC-ENTMCNC: 27 PG — SIGNIFICANT CHANGE UP (ref 27–34)
MCHC RBC-ENTMCNC: 33.3 G/DL — SIGNIFICANT CHANGE UP (ref 32–36)
MCV RBC AUTO: 81 FL — SIGNIFICANT CHANGE UP (ref 80–100)
MONOCYTES # BLD AUTO: 0.47 K/UL — SIGNIFICANT CHANGE UP (ref 0–0.9)
MONOCYTES NFR BLD AUTO: 7.1 % — SIGNIFICANT CHANGE UP (ref 2–14)
NEUTROPHILS # BLD AUTO: 4.41 K/UL — SIGNIFICANT CHANGE UP (ref 1.8–7.4)
NEUTROPHILS NFR BLD AUTO: 66.7 % — SIGNIFICANT CHANGE UP (ref 43–77)
NRBC # BLD AUTO: 0 K/UL — SIGNIFICANT CHANGE UP (ref 0–0)
NRBC # BLD: 0 /100 WBCS — SIGNIFICANT CHANGE UP (ref 0–0)
NRBC # FLD: 0 K/UL — SIGNIFICANT CHANGE UP (ref 0–0)
NRBC BLD-RTO: 0 /100 WBCS — SIGNIFICANT CHANGE UP (ref 0–0)
PLATELET # BLD AUTO: 217 K/UL — SIGNIFICANT CHANGE UP (ref 150–400)
RBC # BLD: 4.59 M/UL — SIGNIFICANT CHANGE UP (ref 3.8–5.2)
RBC # FLD: 13.3 % — SIGNIFICANT CHANGE UP (ref 10.3–14.5)
RH IG SCN BLD-IMP: POSITIVE — SIGNIFICANT CHANGE UP
RH IG SCN BLD-IMP: POSITIVE — SIGNIFICANT CHANGE UP
WBC # BLD: 6.61 K/UL — SIGNIFICANT CHANGE UP (ref 3.8–10.5)
WBC # FLD AUTO: 6.61 K/UL — SIGNIFICANT CHANGE UP (ref 3.8–10.5)

## 2025-01-23 PROCEDURE — 76816 OB US FOLLOW-UP PER FETUS: CPT | Mod: 26

## 2025-01-23 PROCEDURE — 76819 FETAL BIOPHYS PROFIL W/O NST: CPT | Mod: 26,59

## 2025-01-23 RX ORDER — ANTISEPTIC SURGICAL SCRUB 0.04 MG/ML
1 SOLUTION TOPICAL DAILY
Refills: 0 | Status: DISCONTINUED | OUTPATIENT
Start: 2025-01-23 | End: 2025-01-24

## 2025-01-23 RX ORDER — SODIUM CHLORIDE 9 G/ML
1000 INJECTION, SOLUTION INTRAVENOUS
Refills: 0 | Status: DISCONTINUED | OUTPATIENT
Start: 2025-01-23 | End: 2025-01-24

## 2025-01-23 RX ORDER — OXYTOCIN/0.9 % SODIUM CHLORIDE 10/500ML
PLASTIC BAG, INJECTION (ML) INTRAVENOUS
Qty: 30 | Refills: 0 | Status: DISCONTINUED | OUTPATIENT
Start: 2025-01-23 | End: 2025-01-24

## 2025-01-23 RX ORDER — SODIUM CITRATE AND CITRIC ACID MONOHYDRATE 1002; 1500 MG/15ML; MG/15ML
15 SOLUTION ORAL EVERY 6 HOURS
Refills: 0 | Status: DISCONTINUED | OUTPATIENT
Start: 2025-01-23 | End: 2025-01-24

## 2025-01-23 RX ORDER — BACTERIOSTATIC SODIUM CHLORIDE 0.9 %
1000 VIAL (ML) INJECTION
Refills: 0 | Status: DISCONTINUED | OUTPATIENT
Start: 2025-01-23 | End: 2025-01-24

## 2025-01-23 RX ORDER — OXYTOCIN/0.9 % SODIUM CHLORIDE 10/500ML
167 PLASTIC BAG, INJECTION (ML) INTRAVENOUS
Qty: 30 | Refills: 0 | Status: COMPLETED | OUTPATIENT
Start: 2025-01-23 | End: 2025-01-23

## 2025-01-23 RX ADMIN — Medication 108 GRAM(S): at 20:55

## 2025-01-23 RX ADMIN — Medication 108 GRAM(S): at 17:16

## 2025-01-23 RX ADMIN — SODIUM CHLORIDE 125 MILLILITER(S): 9 INJECTION, SOLUTION INTRAVENOUS at 15:01

## 2025-01-23 RX ADMIN — Medication 2 MILLIUNIT(S)/MIN: at 20:17

## 2025-01-23 RX ADMIN — Medication 200 GRAM(S): at 13:17

## 2025-01-23 RX ADMIN — Medication 125 MILLILITER(S): at 13:16

## 2025-01-23 NOTE — OB RN PATIENT PROFILE - LIMIT VISITORS, INFANT PROFILE
Pt stating she was thinking and realized her muscles have been twitching more than usual. States muscle twitches are from her Benign fasciculation syndrome (BFS) and states she hasnt had muscle twitches for a few months but is having some the last 2 days   
no

## 2025-01-23 NOTE — OB RN PATIENT PROFILE - NSMATERNALFETALCONCERNS_OBGYN_ALL_OB_FT
Maternal Alert  This patient participates in the [facility] ECHO/EFFECT study (PI Dr. Rea).  Please notify the OB Attending and PA upon admission for delivery, as the cord blood and placenta will need to be collected.

## 2025-01-23 NOTE — OB PROVIDER H&P - HISTORY OF PRESENT ILLNESS
30 yo  @37+5 here for IOL for oligo (SAMIR of 2.2 in office today). Preg c/b A2.     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 Yes 28 yo  @37+5 here for IOL for oligo (SAMIR of 2.2 in office today). 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 Yes  ID: [id number]  30 yo  @37+5 here for IOL for oligo (SAMIR of 2.2 in office today). 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 Yes

## 2025-01-23 NOTE — OB PROVIDER H&P - ASSESSMENT
30 yo  @37+5 here for IOL for oligo  GBS+    Plan  1. Admit to LND. Routine Labs. IVF.  2. IOL w/ ***  3. Fetus: cat 1 tracing. VTX. Continuous EFM. Sono. No concerns.  4. Prenatal issues: A2 monitor fingersticks   5. GBS + for amp  6. Pain: IV pain meds/epidural PRN    Patient discussed with attending physician, Dr. Brooks.  Suzie Matute PGY1 28 yo  @37+5 here for IOL for oligo  GBS+    Plan  1. Admit to LND. Routine Labs. IVF.  2. IOL w/ VC  3. Fetus: cat 1 tracing. VTX. Continuous EFM. Sono. No concerns.  4. Prenatal issues: A2 monitor fingersticks   5. GBS + for amp  6. Pain: IV pain meds/epidural PRN    Patient discussed with attending physician, Dr. Brooks.  Seen and examined with Audrey Matute PGY1

## 2025-01-23 NOTE — OB PROVIDER H&P - NSHPPHYSICALEXAM_GEN_ALL_CORE
Gen: resting comfortably in bed  Pulm: breathing comfortably on room air  Abd: Gravid  Ext: moving all ext w ease    VE:   Sono: vtx Gen: resting comfortably in bed  Pulm: breathing comfortably on room air  Abd: Gravid  Ext: moving all ext w ease    VE: 0.5/50/-3  Sono: vtx

## 2025-01-23 NOTE — OB PROVIDER H&P - MLM HIDDEN
Airway       Patient location during procedure: OR  Staff -        Performed By: CRNA  Consent for Airway        Urgency: elective  Indications and Patient Condition       Indications for airway management: lindsey-procedural       Induction type:intravenous       Mask difficulty assessment: 1 - vent by mask    Final Airway Details       Final airway type: endotracheal airway       Successful airway: ETT - single  Endotracheal Airway Details        ETT size (mm): 8.0       Cuffed: yes       Cuff volume (mL): 23       Successful intubation technique: direct laryngoscopy       DL Blade Type: Hewitt 2       Grade View of Cords: 1       Adjucts: stylet       Position: Right       Measured from: gums/teeth       Secured at (cm): 23       Bite Block used: GI.    Post intubation assessment        Placement verified by: capnometry, equal breath sounds and chest rise        Number of attempts at approach: 1       Secured with: plastic tape       Ease of procedure: easy       Dentition: Intact and Unchanged    Medication(s) Administered   Medication Administration Time: 3/31/2021 7:49 AM            
yes

## 2025-01-23 NOTE — OB PROVIDER H&P - NSLOWPPHRISK_OBGYN_A_OB
No previous uterine incision/Jean Baptiste Pregnancy/Less than or equal to 4 previous vaginal births/No known bleeding disorder/No history of postpartum hemorrhage/No other PPH risks indicated

## 2025-01-23 NOTE — OB RN PATIENT PROFILE - NSSDOHFOODLAST_OBGYN_A_OB
never true Complex Repair And Graft Additional Text (Will Appearing After The Standard Complex Repair Text): The complex repair was not sufficient to completely close the primary defect. The remaining additional defect was repaired with the graft mentioned below.

## 2025-01-24 ENCOUNTER — TRANSCRIPTION ENCOUNTER (OUTPATIENT)
Age: 30
End: 2025-01-24

## 2025-01-24 LAB — T PALLIDUM AB TITR SER: NEGATIVE — SIGNIFICANT CHANGE UP

## 2025-01-24 PROCEDURE — 59409 OBSTETRICAL CARE: CPT | Mod: U7

## 2025-01-24 RX ORDER — OXYCODONE HYDROCHLORIDE 30 MG/1
5 TABLET ORAL ONCE
Refills: 0 | Status: DISCONTINUED | OUTPATIENT
Start: 2025-01-24 | End: 2025-01-26

## 2025-01-24 RX ORDER — MAGNESIUM HYDROXIDE 400 MG/5ML
30 SUSPENSION, ORAL (FINAL DOSE FORM) ORAL
Refills: 0 | Status: DISCONTINUED | OUTPATIENT
Start: 2025-01-24 | End: 2025-01-26

## 2025-01-24 RX ORDER — PRAMOXINE HCL 1 %
1 CREAM (GRAM) RECTAL EVERY 4 HOURS
Refills: 0 | Status: DISCONTINUED | OUTPATIENT
Start: 2025-01-24 | End: 2025-01-26

## 2025-01-24 RX ORDER — IBUPROFEN 600 MG/1
600 TABLET, FILM COATED ORAL EVERY 6 HOURS
Refills: 0 | Status: DISCONTINUED | OUTPATIENT
Start: 2025-01-24 | End: 2025-01-26

## 2025-01-24 RX ORDER — CLOSTRIDIUM TETANI TOXOID ANTIGEN (FORMALDEHYDE INACTIVATED), CORYNEBACTERIUM DIPHTHERIAE TOXOID ANTIGEN (FORMALDEHYDE INACTIVATED), BORDETELLA PERTUSSIS TOXOID ANTIGEN (GLUTARALDEHYDE INACTIVATED), BORDETELLA PERTUSSIS FILAMENTOUS HEMAGGLUTININ ANTIGEN (FORMALDEHYDE INACTIVATED), BORDETELLA PERTUSSIS PERTACTIN ANTIGEN, AND BORDETELLA PERTUSSIS FIMBRIAE 2/3 ANTIGEN 5; 2; 2.5; 5; 3; 5 [LF]/.5ML; [LF]/.5ML; UG/.5ML; UG/.5ML; UG/.5ML; UG/.5ML
0.5 INJECTION, SUSPENSION INTRAMUSCULAR ONCE
Refills: 0 | Status: DISCONTINUED | OUTPATIENT
Start: 2025-01-24 | End: 2025-01-26

## 2025-01-24 RX ORDER — ACETAMINOPHEN 160 MG/5ML
975 SUSPENSION ORAL
Refills: 0 | Status: DISCONTINUED | OUTPATIENT
Start: 2025-01-24 | End: 2025-01-26

## 2025-01-24 RX ORDER — CEFOTETAN DISODIUM 2 G
2 VIAL (EA) INJECTION ONCE
Refills: 0 | Status: COMPLETED | OUTPATIENT
Start: 2025-01-24 | End: 2025-01-24

## 2025-01-24 RX ORDER — IBUPROFEN 600 MG/1
600 TABLET, FILM COATED ORAL EVERY 6 HOURS
Refills: 0 | Status: COMPLETED | OUTPATIENT
Start: 2025-01-24 | End: 2025-12-23

## 2025-01-24 RX ORDER — BACTERIOSTATIC SODIUM CHLORIDE 0.9 %
300 VIAL (ML) INJECTION ONCE
Refills: 0 | Status: DISCONTINUED | OUTPATIENT
Start: 2025-01-24 | End: 2025-01-26

## 2025-01-24 RX ORDER — KETOROLAC TROMETHAMINE 10 MG
30 TABLET ORAL ONCE
Refills: 0 | Status: DISCONTINUED | OUTPATIENT
Start: 2025-01-24 | End: 2025-01-24

## 2025-01-24 RX ORDER — OXYCODONE HYDROCHLORIDE 30 MG/1
5 TABLET ORAL
Refills: 0 | Status: DISCONTINUED | OUTPATIENT
Start: 2025-01-24 | End: 2025-01-26

## 2025-01-24 RX ORDER — OXYTOCIN/0.9 % SODIUM CHLORIDE 10/500ML
10 PLASTIC BAG, INJECTION (ML) INTRAVENOUS ONCE
Refills: 0 | Status: COMPLETED | OUTPATIENT
Start: 2025-01-24 | End: 2025-01-24

## 2025-01-24 RX ORDER — OXYTOCIN/0.9 % SODIUM CHLORIDE 10/500ML
167 PLASTIC BAG, INJECTION (ML) INTRAVENOUS
Qty: 30 | Refills: 0 | Status: DISCONTINUED | OUTPATIENT
Start: 2025-01-24 | End: 2025-01-26

## 2025-01-24 RX ORDER — HYDROCORTISONE 1 %
1 CREAM (GRAM) TOPICAL EVERY 6 HOURS
Refills: 0 | Status: DISCONTINUED | OUTPATIENT
Start: 2025-01-24 | End: 2025-01-26

## 2025-01-24 RX ORDER — PNV WITH CALCIUM NO.11/IRON/FA 65 MG-1 MG
1 TABLET ORAL DAILY
Refills: 0 | Status: DISCONTINUED | OUTPATIENT
Start: 2025-01-24 | End: 2025-01-26

## 2025-01-24 RX ORDER — WITCH HAZEL 86 ML/100ML
1 OIL ORAL; TOPICAL EVERY 4 HOURS
Refills: 0 | Status: DISCONTINUED | OUTPATIENT
Start: 2025-01-24 | End: 2025-01-26

## 2025-01-24 RX ORDER — TERBUTALINE SULFATE 5 MG/1
0.25 TABLET ORAL ONCE
Refills: 0 | Status: COMPLETED | OUTPATIENT
Start: 2025-01-24 | End: 2025-01-24

## 2025-01-24 RX ORDER — BACTERIOSTATIC SODIUM CHLORIDE 0.9 %
1000 VIAL (ML) INJECTION
Refills: 0 | Status: DISCONTINUED | OUTPATIENT
Start: 2025-01-24 | End: 2025-01-26

## 2025-01-24 RX ORDER — BACTERIOSTATIC SODIUM CHLORIDE 0.9 %
3 VIAL (ML) INJECTION EVERY 8 HOURS
Refills: 0 | Status: DISCONTINUED | OUTPATIENT
Start: 2025-01-24 | End: 2025-01-26

## 2025-01-24 RX ORDER — DIPHENHYDRAMINE HCL 25 MG
25 CAPSULE ORAL EVERY 6 HOURS
Refills: 0 | Status: DISCONTINUED | OUTPATIENT
Start: 2025-01-24 | End: 2025-01-26

## 2025-01-24 RX ADMIN — Medication 167 MILLIUNIT(S)/MIN: at 06:15

## 2025-01-24 RX ADMIN — IBUPROFEN 600 MILLIGRAM(S): 600 TABLET, FILM COATED ORAL at 14:00

## 2025-01-24 RX ADMIN — Medication 1 TABLET(S): at 13:28

## 2025-01-24 RX ADMIN — IBUPROFEN 600 MILLIGRAM(S): 600 TABLET, FILM COATED ORAL at 13:28

## 2025-01-24 RX ADMIN — ACETAMINOPHEN 975 MILLIGRAM(S): 160 SUSPENSION ORAL at 17:48

## 2025-01-24 RX ADMIN — Medication 3 MILLILITER(S): at 20:19

## 2025-01-24 RX ADMIN — ACETAMINOPHEN 975 MILLIGRAM(S): 160 SUSPENSION ORAL at 18:21

## 2025-01-24 RX ADMIN — IBUPROFEN 600 MILLIGRAM(S): 600 TABLET, FILM COATED ORAL at 20:19

## 2025-01-24 RX ADMIN — IBUPROFEN 600 MILLIGRAM(S): 600 TABLET, FILM COATED ORAL at 20:50

## 2025-01-24 RX ADMIN — Medication 10 UNIT(S): at 03:41

## 2025-01-24 RX ADMIN — ACETAMINOPHEN 975 MILLIGRAM(S): 160 SUSPENSION ORAL at 08:15

## 2025-01-24 RX ADMIN — ACETAMINOPHEN 975 MILLIGRAM(S): 160 SUSPENSION ORAL at 07:38

## 2025-01-24 RX ADMIN — Medication 30 MILLIGRAM(S): at 04:33

## 2025-01-24 RX ADMIN — Medication 3 MILLILITER(S): at 07:30

## 2025-01-24 RX ADMIN — Medication 3 MILLILITER(S): at 14:36

## 2025-01-24 RX ADMIN — TERBUTALINE SULFATE 0.25 MILLIGRAM(S): 5 TABLET ORAL at 03:29

## 2025-01-24 RX ADMIN — Medication 108 GRAM(S): at 00:48

## 2025-01-24 RX ADMIN — Medication 100 GRAM(S): at 07:37

## 2025-01-24 RX ADMIN — Medication 30 MILLIGRAM(S): at 05:03

## 2025-01-24 NOTE — OB PROVIDER LABOR PROGRESS NOTE - NS_SUBJECTIVE/OBJECTIVE_OBGYN_ALL_OB_FT
Pt seen and examined at bedside for placement of vaginal cytotec.
S:  Pt seen and examined for increased pressure.    O:  Vital Signs Last 24 Hrs  T(C): 37.0 (24 Jan 2025 01:00), Max: 37.0 (23 Jan 2025 19:14)  T(F): 98.6 (24 Jan 2025 01:00), Max: 98.6 (23 Jan 2025 19:14)  HR: 91 (24 Jan 2025 02:36) (79 - 120)  BP: 111/65 (24 Jan 2025 02:36) (106/68 - 151/84)  BP(mean): --  RR: 19 (24 Jan 2025 01:00) (16 - 19)  SpO2: 94% (24 Jan 2025 02:34) (78% - 100%)    Parameters below as of 23 Jan 2025 12:33  Patient On (Oxygen Delivery Method): room air
Pt sen ad examined at bedside. 2d dose of vc given at this time.
S:  Pt seen and examined for cervical change.    O:  Vital Signs Last 24 Hrs  T(C): 36.4 (23 Jan 2025 23:00), Max: 37.0 (23 Jan 2025 19:14)  T(F): 97.52 (23 Jan 2025 23:00), Max: 98.6 (23 Jan 2025 19:14)  HR: 86 (23 Jan 2025 20:40) (82 - 108)  BP: 121/73 (23 Jan 2025 19:25) (110/68 - 121/73)  BP(mean): --  RR: 17 (23 Jan 2025 23:00) (16 - 19)  SpO2: 100% (23 Jan 2025 20:40) (89% - 100%)    Parameters below as of 23 Jan 2025 12:33  Patient On (Oxygen Delivery Method): room air
S:  Pt seen and examined for cervical change.    O:  Vital Signs Last 24 Hrs  T(C): 37.0 (24 Jan 2025 01:00), Max: 37.0 (23 Jan 2025 19:14)  T(F): 98.6 (24 Jan 2025 01:00), Max: 98.6 (23 Jan 2025 19:14)  HR: 97 (24 Jan 2025 03:04) (79 - 120)  BP: 128/84 (24 Jan 2025 03:07) (97/53 - 151/84)  BP(mean): --  RR: 19 (24 Jan 2025 01:00) (16 - 19)  SpO2: 100% (24 Jan 2025 03:04) (78% - 100%)    Parameters below as of 23 Jan 2025 12:33  Patient On (Oxygen Delivery Method): room air

## 2025-01-24 NOTE — DISCHARGE NOTE OB - PROVIDER TOKENS
FREE:[LAST:[[facility]],PHONE:[(   )    -],FAX:[(   )    -],ADDRESS:[[facility], [facility]  [address] [place] [address]  [phone]],FOLLOWUP:[1 month]],FREE:[LAST:[Gastroenterology],PHONE:[(   )    -],FAX:[(   )    -],ADDRESS:[[facility] [place]  [phone]  [address] [place] [address], [address]  [place] [address]]]

## 2025-01-24 NOTE — DISCHARGE NOTE OB - FINANCIAL ASSISTANCE
[facility] provides services at a reduced cost to those who are determined to be eligible through [facility]’s financial assistance program. Information regarding [facility]’s financial assistance program can be found by going to [email or web address] or by calling [phone].

## 2025-01-24 NOTE — DISCHARGE NOTE OB - MEDICATION SUMMARY - MEDICATIONS TO STOP TAKING
I will STOP taking the medications listed below when I get home from the hospital:    HumaLOG KwikPen 100 units/mL injectable solution  -- 6 unit(s) injectable 3 times a day (with meals)    Lantus Solostar Pen 100 units/mL subcutaneous solution  -- 25 unit(s) subcutaneous once a day (at bedtime)    alcohol swabs  -- Clean injection site prior to giving insulin    insulin pen needles  -- 3x/day with meals and every night at bedtime

## 2025-01-24 NOTE — DISCHARGE NOTE OB - CARE PLAN
1 Principal Discharge DX:	Vacuum-assisted vaginal delivery  Assessment and plan of treatment:	After discharge, please stay on pelvic rest for 6 weeks, meaning no sexual intercourse, no tampons and no douching.  No driving for 2 weeks as women can loose a lot of blood during delivery and there is a possibility of being lightheaded/fainting.  No lifting objects heavier than baby for two weeks.  Expect to have vaginal bleeding/spotting for up to six weeks.  The bleeding should get lighter and more white/light brown with time.  For bleeding soaking more than a pad an hour or passing clots greater than the size of your fist, come in to the emergency department.    Follow up in clinic in 6 weeks.

## 2025-01-24 NOTE — DISCHARGE NOTE OB - HOSPITAL COURSE
Patient had a vacuum assisted vaginal delivery complicated by a third degree tear. Please see delivery note for details.  During postpartum course patient's vitals were stable, vaginal bleeding appropriate, and pain well controlled.  On day of discharge patient was ambulating, her pain controlled with oral medications, had adequate oral intake, and was voiding freely.  Discharge instructions and precautions were given.  Will return to clinic in 6 weeks for postpartum visit.  Postpartum birth control plan is ____.   Patient had a vacuum assisted vaginal delivery complicated by a third degree tear. Please see delivery note for details.  During postpartum course patient's vitals were stable, vaginal bleeding appropriate, and pain well controlled.  On day of discharge patient was ambulating, her pain controlled with oral medications, had adequate oral intake, and was voiding freely.  Discharge instructions and precautions were given.  Will return to clinic in 6 weeks for postpartum visit.  Postpartum birth control plan is Nexplanon- placed during admission.

## 2025-01-24 NOTE — OB PROVIDER LABOR PROGRESS NOTE - ASSESSMENT
IOL for oligo    - VC#2 given at this time  - switch to pit  - cont to monitor toco/ tracing    To be dw Dr. Chandra Matute PGY1
Pt is a 30yo  admitted for IOL for oligohydramnios.    - AROM performed at 1210a; blood-tinged fluid  - Continue pitocin (currently at 8 mu)  - Epidural prn  - Continue cont EFM, toco, IVF  - GBS positive on Ampicillin    D/w Dr. Artis, service attending  Grisel Brown MD PGY1
Pt is a 28yo  admitted for IOL for oligohydramnios    - Continue pitocin (currently at 8mu)  - S/p AROM@1210a  - S/p VC  - Continue cont EFM, toco, IVF  - GBS positive on Ampicillin    Plan per Dr. Brink, attending  Grisel Brown MD PGY1
Pt is a 28yo  admitted for IOL for oligohydramnios    - IUPC placed due to recurrent variable decels, to start amnioinfusion  - Pitocin paused earlier due to loss of contact, discontinuous tracing  - To restart pitocin  - S/p AROM, VC  - Continue cont EFM, toco, IVF  - GBS positive on Ampicillin    Plan per Dr. Brink, attending  Grisel Brown MD PGY1
 IOL for oligo (2.2)    - s/p 1 dose of CV  - cont to monitor toco/ tracing    Plan as per Dr. Todd Matute PGY1

## 2025-01-24 NOTE — OB RN DELIVERY SUMMARY - NS_GENERALBABYACOMMENTA_OBGYN_ALL_OB_FT
Delayed skin to skin due maternal and  condition.  needed to be evaluated by NICU Resus team, Mother has 3rd degree laceration needed repair and pain management. Delayed skin to skin due maternal and  condition.  needed to be evaluated by NICU Resus team, Needed CPAP. Mother has 3rd degree laceration needed repair and pain management.

## 2025-01-24 NOTE — OB RN DELIVERY SUMMARY - NS_SEPSISRSKCALC_OBGYN_ALL_OB_FT
EOS calculated successfully. EOS Risk Factor: 0.5/1000 live births ([facility] national incidence); GA=37w6d; Temp=98.6; ROM=2.217; GBS='Positive'; Antibiotics='GBS specific antibiotics > 2 hrs prior to birth'

## 2025-01-24 NOTE — DISCHARGE NOTE OB - CARE PROVIDER_API CALL
[facility],   Oncology Building, Basement  [address] [place] [address]  [phone]  Phone: (   )    -  Fax: (   )    -  Follow Up Time: 1 month    Gastroenterology,   [facility] [place]  [phone] [address], [address]  [place] [address]  Phone: (   )    -  Fax: (   )    -  Follow Up Time:

## 2025-01-24 NOTE — OB RN DELIVERY SUMMARY - NS_CORDVESSELS_OBGYN_ALL_OB
3

## 2025-01-24 NOTE — DISCHARGE NOTE OB - PATIENT PORTAL LINK FT
You can access the FollowMyHealth Patient Portal offered by [facility] by registering at the following website: [email or web address]. By joining [employer]’s FollowMyHealth portal, you will also be able to view your health information using other applications (apps) compatible with our system.

## 2025-01-24 NOTE — OB PROVIDER LABOR PROGRESS NOTE - NS_OBIHIFHRDETAILS_OBGYN_ALL_OB_FT
baseline 120bpm, moderate variability, +accels, -decels
baseline 115 bpm, moderate variability, +accels, +intermittent variable decels
baseline 115 bpm, moderate variability, +accels, +intermittent variable decels

## 2025-01-24 NOTE — OB RN DELIVERY SUMMARY - NSSELHIDDEN_OBGYN_ALL_OB_FT
[NS_DeliveryAttending1_OBGYN_ALL_OB_FT:[id number]],[NS_DeliveryAssist1_OBGYN_ALL_OB_FT:[id number]],[NS_DeliveryAssist2_OBGYN_ALL_OB_FT:[email or web address]],[NS_DeliveryAttending2_OBGYN_ALL_OB_FT:[id number]]

## 2025-01-24 NOTE — DISCHARGE NOTE OB - MEDICATION SUMMARY - MEDICATIONS TO TAKE
I will START or STAY ON the medications listed below when I get home from the hospital:    ibuprofen 600 mg oral tablet  -- 1 tab(s) by mouth every 6 hours  -- Indication: For pain    acetaminophen 325 mg oral tablet  -- 3 tab(s) by mouth every 6 hours  -- Indication: For pain    lanolin topical ointment  -- 1 Apply on skin to affected area every 6 hours As needed nipple soreness  -- Indication: For Nipple pain    witch hazel 50% topical pad  -- 1 Apply on skin to affected area every 4 hours As needed Perineal discomfort  -- Indication: For Perineal pain     etonogestrel 68 mg subcutaneous implant  -- 1 each subcutaneous once  -- Indication: For Contraception

## 2025-01-24 NOTE — OB RN DELIVERY SUMMARY - NSSKINTOSKINA_OBGYN_ALL_OB_START_DATE
Natalia Triplett was admitted to the unit at 1435  She arrived from [address]  Patient presents to the unit due to SI/HI  Per the following ER note: 59-year-old female brought into the emergency department by police due to increasing agitation and psychotic behavior  Patient's mother apparently contacted the police and stated the patient was running around the house saying that she was going herself and other people  She has apparently not been on her medication for 2 days  Patient is here in appears actively psychotic and not making sense and seems to be rambling about multiple different things like falling asleep and the fact that she knows about some fletcher who killed another person  The patient goes from barely talking to becoming very agitated  Patient was brought in for a 302  Per this writer patient is Alert and oriented X 4  Patient has good insight and states " I know I have psychosis and need help  I tried to commit suicide by drinking and starving myself to death " She admits to increased depression, anxiety  States "I get anxious when there is too much going on "  Patient is here on a 201  Reports that's she is due for Invega injection the 3rd week of may  Patient seems to be adjusting to the unit  She is pleasant, cooperative, and visible  Denies SI/HI at this time   Continue to monitor      24-Jan-2025 04:04

## 2025-01-24 NOTE — DISCHARGE NOTE OB - PLAN OF CARE
After discharge, please stay on pelvic rest for 6 weeks, meaning no sexual intercourse, no tampons and no douching.  No driving for 2 weeks as women can loose a lot of blood during delivery and there is a possibility of being lightheaded/fainting.  No lifting objects heavier than baby for two weeks.  Expect to have vaginal bleeding/spotting for up to six weeks.  The bleeding should get lighter and more white/light brown with time.  For bleeding soaking more than a pad an hour or passing clots greater than the size of your fist, come in to the emergency department.    Follow up in clinic in 6 weeks.
Yes

## 2025-01-24 NOTE — OB NEONATOLOGY/PEDIATRICIAN DELIVERY SUMMARY - NSPEDSNEONOTESA_OBGYN_ALL_OB_FT
History and Physical Exam: 37.5wk M born AGA via  to a 29y/o  blood type O+ mother. Maternal history of GDMA (on insulin), sickle cell trait, iron deficiency, previous shoulder dystocia. Prenatal history of IOL for oligohydramnios. PNL HIV -/Hep B-/RPR SENT/Rubella immune, GBS+ on , received ampicillin. NICU resus called for terminal bradycardia. Delivery complicated by vacuum assisted, 1x pop-off. Baby emerged vigorous, crying, was w/d/s/s with APGARS of 8/9. Mom plans to initiate breastfeeding, consents Hep B vaccine, and consents circ. EOS 0.07    : 25  TOB: 03:38  Birth weight: 3370g    Physical Exam:  Gen: +grimace  HEENT:  anterior fontanel open soft and flat, nondysmorphic facies, no cleft lip/palate, ears normal set, no ear pits or tags, nares clinically patent  Resp: Normal respiratory effort without grunting or retractions, good air entry b/l, clear to auscultation bilaterally  Cardio: Present S1/S2, regular rate and rhythm, no murmurs  Abd: soft, non tender, non distended, umbilical cord with 3 vessels  Neuro: +palmar and plantar grasp, +suck, +low, normal tone  Extremities: moving all extremities, no clavicular crepitus or stepoff  Skin: pink, warm  Genitals: Normal male anatomy, testicles palpable in scrotum b/l, Jaylan 1, anus patent

## 2025-01-24 NOTE — OB PROVIDER DELIVERY SUMMARY - NSSELHIDDEN_OBGYN_ALL_OB_FT
[NS_DeliveryAttending1_OBGYN_ALL_OB_FT:[id number]],[NS_DeliveryAssist1_OBGYN_ALL_OB_FT:[id number]],[NS_DeliveryAssist2_OBGYN_ALL_OB_FT:[id number]=],[NS_DeliveryAttending2_OBGYN_ALL_OB_FT:[email or web address]]

## 2025-01-24 NOTE — DISCHARGE NOTE OB - NS MD DC FALL RISK RISK
For information on Fall & Injury Prevention, visit: [email or web address] OR  [email or web address] OR  https://www.cdc.gov/steadi/patient.html

## 2025-01-24 NOTE — DISCHARGE NOTE OB - IF YOU ARE A SMOKER, IT IS IMPORTANT FOR YOUR HEALTH TO STOP SMOKING. PLEASE BE AWARE THAT SECOND HAND SMOKE IS ALSO HARMFUL.
Contacted the patient to schedule a pre admit testing appointment (PAT) for an endoscopy procedure. The patient did not answer the call and left a voice message requesting a call back, direct line provided.    Statement Selected

## 2025-01-24 NOTE — OB PROVIDER DELIVERY SUMMARY - NSPROVIDERDELIVERYNOTE_OBGYN_ALL_OB_FT
FHT discontinuous and unable to trace FHT using 2 different ISE and external monitors. Patient found to be fully dilated. Instructed to push using , however, poor maternal effort was noted with little descent of fetal head. Dr. Artis came to bedside for assistance and decision made to apply Kiwi vacuum at +3 station, fetal head delivered with 2 pulls, 1 pop off. Tight nuchal x2 noted. Nose and mouth bulb suctioned.  Cord clamped and cut after delay.  Infant handed to awaiting pediatricians.   Cord gases sent.    On examination of perineum, no appreciable perineal body palpated, on rectal exam no sphincter tone noted and using , patient reports loss of feces and inability to control gas since her last delivery of 11lb infant c/b shoulder dystocia. Inappropriately healed previous RML or right perineal tear noted with very small anal sphincter muscles identified and repaired with vicryl suture. First degree laceration of posterior vagina noted and repaired.     Rectal exam following repair with no suture material in the rectum noted. FHT discontinuous and unable to trace FHT using 2 different ISE and external monitors. Audible decelerations noted at times.  Patient examined and noted fully dilated. Instructed to push using , however, poor maternal effort was noted with little descent of fetal head. Dr. Artis came to bedside for assistance and decision made to apply Kiwi vacuum at +3 station, fetal head delivered with 2 pulls, 1 pop off. Tight nuchal x2 noted and reduced. Nose and mouth bulb suctioned.  Cord clamped and cut after 60 sec. delay.  Infant handed to awaiting pediatricians.   Cord gases sent.  On examination of perineum, no appreciable perineal body palpated, on rectal exam no sphincter tone noted and using , patient reports loss of feces and inability to control gas since her last delivery of 11lb infant c/b shoulder dystocia and baby with Erb's. Inappropriately healed previous RML or right perineal tear noted with very small anal sphincter muscles identified and repaired with Vicryl suture. First degree laceration of posterior vagina noted and repaired.     Rectal exam following repair with no suture material in the rectum noted and improved tone.   cefotetan received x1 and patient advised needs followup with colorectal   Attending -PENNY Artis

## 2025-01-25 LAB
BASOPHILS # BLD AUTO: 0.04 K/UL — SIGNIFICANT CHANGE UP (ref 0–0.2)
BASOPHILS NFR BLD AUTO: 0.4 % — SIGNIFICANT CHANGE UP (ref 0–2)
EOSINOPHIL # BLD AUTO: 0.13 K/UL — SIGNIFICANT CHANGE UP (ref 0–0.5)
EOSINOPHIL NFR BLD AUTO: 1.4 % — SIGNIFICANT CHANGE UP (ref 0–6)
HCT VFR BLD CALC: 32.3 % — LOW (ref 34.5–45)
HGB BLD-MCNC: 11 G/DL — LOW (ref 11.5–15.5)
IANC: 6.12 K/UL — SIGNIFICANT CHANGE UP (ref 1.8–7.4)
IMM GRANULOCYTES NFR BLD AUTO: 0.6 % — SIGNIFICANT CHANGE UP (ref 0–0.9)
LYMPHOCYTES # BLD AUTO: 2.37 K/UL — SIGNIFICANT CHANGE UP (ref 1–3.3)
LYMPHOCYTES # BLD AUTO: 25.6 % — SIGNIFICANT CHANGE UP (ref 13–44)
MCHC RBC-ENTMCNC: 27.1 PG — SIGNIFICANT CHANGE UP (ref 27–34)
MCHC RBC-ENTMCNC: 34.1 G/DL — SIGNIFICANT CHANGE UP (ref 32–36)
MCV RBC AUTO: 79.6 FL — LOW (ref 80–100)
MONOCYTES # BLD AUTO: 0.53 K/UL — SIGNIFICANT CHANGE UP (ref 0–0.9)
MONOCYTES NFR BLD AUTO: 5.7 % — SIGNIFICANT CHANGE UP (ref 2–14)
NEUTROPHILS # BLD AUTO: 6.12 K/UL — SIGNIFICANT CHANGE UP (ref 1.8–7.4)
NEUTROPHILS NFR BLD AUTO: 66.3 % — SIGNIFICANT CHANGE UP (ref 43–77)
NRBC # BLD AUTO: 0 K/UL — SIGNIFICANT CHANGE UP (ref 0–0)
NRBC # BLD: 0 /100 WBCS — SIGNIFICANT CHANGE UP (ref 0–0)
NRBC # FLD: 0 K/UL — SIGNIFICANT CHANGE UP (ref 0–0)
NRBC BLD-RTO: 0 /100 WBCS — SIGNIFICANT CHANGE UP (ref 0–0)
PLATELET # BLD AUTO: 205 K/UL — SIGNIFICANT CHANGE UP (ref 150–400)
RBC # BLD: 4.06 M/UL — SIGNIFICANT CHANGE UP (ref 3.8–5.2)
RBC # FLD: 13.5 % — SIGNIFICANT CHANGE UP (ref 10.3–14.5)
WBC # BLD: 9.25 K/UL — SIGNIFICANT CHANGE UP (ref 3.8–10.5)
WBC # FLD AUTO: 9.25 K/UL — SIGNIFICANT CHANGE UP (ref 3.8–10.5)

## 2025-01-25 PROCEDURE — 11981 INSERTION DRUG DLVR IMPLANT: CPT | Mod: U9,GC

## 2025-01-25 RX ORDER — SENNOSIDES 8.6 MG
2 TABLET ORAL AT BEDTIME
Refills: 0 | Status: DISCONTINUED | OUTPATIENT
Start: 2025-01-25 | End: 2025-01-26

## 2025-01-25 RX ORDER — LIDOCAINE HYDROCHLORIDE 10 MG/ML
10 INJECTION EPIDURAL; INFILTRATION; INTRACAUDAL ONCE
Refills: 0 | Status: DISCONTINUED | OUTPATIENT
Start: 2025-01-25 | End: 2025-01-26

## 2025-01-25 RX ORDER — ETONOGESTREL 68 MG/1
68 IMPLANT SUBCUTANEOUS ONCE
Refills: 0 | Status: DISCONTINUED | OUTPATIENT
Start: 2025-01-25 | End: 2025-01-26

## 2025-01-25 RX ADMIN — Medication 1 TABLET(S): at 12:43

## 2025-01-25 RX ADMIN — IBUPROFEN 600 MILLIGRAM(S): 600 TABLET, FILM COATED ORAL at 13:20

## 2025-01-25 RX ADMIN — ACETAMINOPHEN 975 MILLIGRAM(S): 160 SUSPENSION ORAL at 21:39

## 2025-01-25 RX ADMIN — ACETAMINOPHEN 975 MILLIGRAM(S): 160 SUSPENSION ORAL at 09:03

## 2025-01-25 RX ADMIN — IBUPROFEN 600 MILLIGRAM(S): 600 TABLET, FILM COATED ORAL at 07:06

## 2025-01-25 RX ADMIN — ACETAMINOPHEN 975 MILLIGRAM(S): 160 SUSPENSION ORAL at 00:38

## 2025-01-25 RX ADMIN — Medication 3 MILLILITER(S): at 22:25

## 2025-01-25 RX ADMIN — ACETAMINOPHEN 975 MILLIGRAM(S): 160 SUSPENSION ORAL at 01:00

## 2025-01-25 RX ADMIN — ACETAMINOPHEN 975 MILLIGRAM(S): 160 SUSPENSION ORAL at 21:09

## 2025-01-25 RX ADMIN — ACETAMINOPHEN 975 MILLIGRAM(S): 160 SUSPENSION ORAL at 09:47

## 2025-01-25 RX ADMIN — IBUPROFEN 600 MILLIGRAM(S): 600 TABLET, FILM COATED ORAL at 12:43

## 2025-01-25 RX ADMIN — Medication 2 TABLET(S): at 21:24

## 2025-01-25 RX ADMIN — Medication 3 MILLILITER(S): at 14:00

## 2025-01-25 NOTE — PROGRESS NOTE ADULT - SUBJECTIVE AND OBJECTIVE BOX
28yo PPD#1 s/p VAVD. Patient feels well. Pain is well controlled, tolerating regular diet, passing flatus, voiding spontaneously, ambulating without difficulty. Denies heavy vaginal bleeding, CP/SOB, N/V, lightheadedness/dizziness.     O:  Vitals:  Vital Signs Last 24 Hrs  T(C): 36.6 (25 Jan 2025 06:00), Max: 37.1 (24 Jan 2025 21:24)  T(F): 97.9 (25 Jan 2025 06:00), Max: 98.7 (24 Jan 2025 21:24)  HR: 88 (25 Jan 2025 06:00) (74 - 89)  BP: 110/74 (25 Jan 2025 06:00) (107/65 - 119/63)  BP(mean): 77 (25 Jan 2025 02:15) (77 - 77)  RR: 16 (25 Jan 2025 06:00) (16 - 18)  SpO2: 100% (25 Jan 2025 06:00) (97% - 100%)    Parameters below as of 25 Jan 2025 06:00  Patient On (Oxygen Delivery Method): room air        MEDICATIONS  (STANDING):  acetaminophen     Tablet .. 975 milliGRAM(s) Oral <User Schedule>  diphtheria/tetanus/pertussis (acellular) Vaccine (Adacel) 0.5 milliLiter(s) IntraMuscular once  ibuprofen  Tablet. 600 milliGRAM(s) Oral every 6 hours  oxytocin Infusion 167 milliUNIT(s)/Min (167 mL/Hr) IV Continuous <Continuous>  prenatal multivitamin 1 Tablet(s) Oral daily  sodium chloride 0.9% Bolus 300 milliLiter(s) IV Bolus once  sodium chloride 0.9% lock flush 3 milliLiter(s) IV Push every 8 hours  sodium chloride 0.9%. 1000 milliLiter(s) (125 mL/Hr) IV Continuous <Continuous>      MEDICATIONS  (PRN):  benzocaine 20%/menthol 0.5% Spray 1 Spray(s) Topical every 6 hours PRN for Perineal discomfort  dibucaine 1% Ointment 1 Application(s) Topical every 6 hours PRN Perineal discomfort  diphenhydrAMINE 25 milliGRAM(s) Oral every 6 hours PRN Pruritus  hydrocortisone 1% Cream 1 Application(s) Topical every 6 hours PRN Moderate Pain (4-6)  lanolin Ointment 1 Application(s) Topical every 6 hours PRN nipple soreness  magnesium hydroxide Suspension 30 milliLiter(s) Oral two times a day PRN Constipation  oxyCODONE    IR 5 milliGRAM(s) Oral every 3 hours PRN Moderate to Severe Pain (4-10)  oxyCODONE    IR 5 milliGRAM(s) Oral once PRN Moderate to Severe Pain (4-10)  pramoxine 1%/zinc 5% Cream 1 Application(s) Topical every 4 hours PRN Moderate Pain (4-6)  simethicone 80 milliGRAM(s) Chew every 4 hours PRN Gas  witch hazel Pads 1 Application(s) Topical every 4 hours PRN Perineal discomfort      Labs:  Blood type: O Positive  Rubella IgG: RPR: Negative                          11.0[L]   9.25 >-----------< 205    ( 01-25 @ 06:13 )             32.3[L]                        12.4   6.61 >-----------< 217    ( 01-23 @ 13:00 )             37.2          Physical Exam:  General: NAD  Abdomen: soft, non-tender, non-distended, fundus firm  Vaginal: No heavy vaginal bleeding  Extremities: No erythema/edema   INT [id number]  30yo PPD#1 s/p VAVD. Patient feels well. Pain is well controlled, tolerating regular diet, passing flatus, voiding spontaneously, ambulating without difficulty. Denies heavy vaginal bleeding, CP/SOB, N/V, lightheadedness/dizziness.     O:  Vitals:  Vital Signs Last 24 Hrs  T(C): 36.6 (25 Jan 2025 06:00), Max: 37.1 (24 Jan 2025 21:24)  T(F): 97.9 (25 Jan 2025 06:00), Max: 98.7 (24 Jan 2025 21:24)  HR: 88 (25 Jan 2025 06:00) (74 - 89)  BP: 110/74 (25 Jan 2025 06:00) (107/65 - 119/63)  BP(mean): 77 (25 Jan 2025 02:15) (77 - 77)  RR: 16 (25 Jan 2025 06:00) (16 - 18)  SpO2: 100% (25 Jan 2025 06:00) (97% - 100%)    Parameters below as of 25 Jan 2025 06:00  Patient On (Oxygen Delivery Method): room air        MEDICATIONS  (STANDING):  acetaminophen     Tablet .. 975 milliGRAM(s) Oral <User Schedule>  diphtheria/tetanus/pertussis (acellular) Vaccine (Adacel) 0.5 milliLiter(s) IntraMuscular once  ibuprofen  Tablet. 600 milliGRAM(s) Oral every 6 hours  oxytocin Infusion 167 milliUNIT(s)/Min (167 mL/Hr) IV Continuous <Continuous>  prenatal multivitamin 1 Tablet(s) Oral daily  sodium chloride 0.9% Bolus 300 milliLiter(s) IV Bolus once  sodium chloride 0.9% lock flush 3 milliLiter(s) IV Push every 8 hours  sodium chloride 0.9%. 1000 milliLiter(s) (125 mL/Hr) IV Continuous <Continuous>      MEDICATIONS  (PRN):  benzocaine 20%/menthol 0.5% Spray 1 Spray(s) Topical every 6 hours PRN for Perineal discomfort  dibucaine 1% Ointment 1 Application(s) Topical every 6 hours PRN Perineal discomfort  diphenhydrAMINE 25 milliGRAM(s) Oral every 6 hours PRN Pruritus  hydrocortisone 1% Cream 1 Application(s) Topical every 6 hours PRN Moderate Pain (4-6)  lanolin Ointment 1 Application(s) Topical every 6 hours PRN nipple soreness  magnesium hydroxide Suspension 30 milliLiter(s) Oral two times a day PRN Constipation  oxyCODONE    IR 5 milliGRAM(s) Oral every 3 hours PRN Moderate to Severe Pain (4-10)  oxyCODONE    IR 5 milliGRAM(s) Oral once PRN Moderate to Severe Pain (4-10)  pramoxine 1%/zinc 5% Cream 1 Application(s) Topical every 4 hours PRN Moderate Pain (4-6)  simethicone 80 milliGRAM(s) Chew every 4 hours PRN Gas  witch hazel Pads 1 Application(s) Topical every 4 hours PRN Perineal discomfort      Labs:  Blood type: O Positive  Rubella IgG: RPR: Negative                          11.0[L]   9.25 >-----------< 205    ( 01-25 @ 06:13 )             32.3[L]                        12.4   6.61 >-----------< 217    ( 01-23 @ 13:00 )             37.2          Physical Exam:  General: NAD  Abdomen: soft, non-tender, non-distended, fundus firm  Vaginal: No heavy vaginal bleeding  Extremities: No erythema/edema

## 2025-01-25 NOTE — PROGRESS NOTE ADULT - ASSESSMENT
30yo PPD#1 s/p VAVD complicated by 3rd degree laceration.  Patient is stable and doing well post-partum.     - Pain well controlled, continue current pain regimen  - Increase ambulation, SCDs when not ambulating  - Continue regular diet  - Pt to follow up with GI after d/c for hx of 4th degree with poor rectal tone  noted at time of delivery     Veronika Wellington, PGY-1 28yo PPD#1 s/p VAVD complicated by 3rd degree laceration.  Patient is stable and doing well post-partum.     - Pain well controlled, continue current pain regimen  - Increase ambulation, SCDs when not ambulating  - Continue regular diet  - Pt to follow up with CRS after d/c for hx of 4th degree with poor rectal tone  noted at time of delivery     Veronika Wellington, PGY-1

## 2025-01-25 NOTE — PROCEDURE NOTE - SUPERVISORY STATEMENT
I was present and participate in entire uncomplicated Nexplanon insertion. Card was provided to patient.     JAQUI Augustin MD

## 2025-01-25 NOTE — PROCEDURE NOTE - ADDITIONAL PROCEDURE DETAILS
Site: L arm  Serial # [id number]  Expiration: 2027-02  Lot: [id number]       Insertion site was selected 8 – 10 cm from medial epicondyle.  Procedure area was prepped and draped in a sterile fashion. 5mL of 1% lidocaine without epinephrine used for subcutaneous anesthesia.  Nexplanon trocar was inserted subcutaneously and then Nexplanon capsule delivered subcutaneously.  Trocar was removed from the insertion site.  Nexplanon capsule was palpated by provider and patient to assure satisfactory placement.  Minimal blood loss.  Dressings applied: Gauze pressure dressing  Followup: The patient tolerated the procedure well without complications. Standard post-procedure care explained.    Procedure with Dr. Charli Wellington, PGY-1 Site: L arm  Serial # [id number]  Expiration: 2027-02  Lot: [id number]       Insertion site was selected 8 – 10 cm from medial epicondyle and 3-5cm from bicep groove.   Procedure area was prepped and draped in a sterile fashion with alcohol prep and betadiene. 5mL of 1% lidocaine without epinephrine used for subcutaneous anesthesia, injected at insertion site.   Nexplanon trocar was inserted subcutaneously and then Nexplanon capsule delivered subcutaneously.  Trocar was removed from the insertion site.  Nexplanon capsule was palpated by provider and patient to assure satisfactory placement.  Steristrip and 4x4 gauze placed above insertion site.   Minimal blood loss, EBL. < 1mL.   Dressings applied: Gauze pressure dressing  Followup: The patient tolerated the procedure well without complications. Standard post-procedure care explained.    Procedure with Dr. Charli Wellington, PGY-1    Attending attestation:  Agree with findings and plan as documented. I was present for entire procedure. Site: L arm  Serial # [id number]  Expiration: 2027-02  Lot: [id number]       Insertion site was selected 8 – 10 cm from medial epicondyle and 3-5cm from bicep groove.   Procedure area was prepped and draped in a sterile fashion with alcohol prep and betadiene. 5mL of 1% lidocaine without epinephrine used for subcutaneous anesthesia, injected at insertion site.   Nexplanon trocar was inserted subcutaneously and then Nexplanon capsule delivered subcutaneously.  Trocar was removed from the insertion site.  Nexplanon capsule was palpated by provider and patient to assure satisfactory placement.  Steristrip and 4x4 gauze placed above insertion site.   Minimal blood loss, EBL. < 1mL.   Dressings applied: Gauze pressure dressing  Followup: The patient tolerated the procedure well without complications. Standard post-procedure care explained.    Procedure with Dr. Charli Wellington, PGY-1

## 2025-01-26 VITALS
OXYGEN SATURATION: 99 % | TEMPERATURE: 98 F | SYSTOLIC BLOOD PRESSURE: 110 MMHG | RESPIRATION RATE: 18 BRPM | HEART RATE: 84 BPM | DIASTOLIC BLOOD PRESSURE: 68 MMHG

## 2025-01-26 RX ORDER — ACETAMINOPHEN 160 MG/5ML
3 SUSPENSION ORAL
Qty: 0 | Refills: 0 | DISCHARGE
Start: 2025-01-26

## 2025-01-26 RX ORDER — IBUPROFEN 600 MG/1
1 TABLET, FILM COATED ORAL
Qty: 0 | Refills: 0 | DISCHARGE
Start: 2025-01-26

## 2025-01-26 RX ORDER — ETONOGESTREL 68 MG/1
1 IMPLANT SUBCUTANEOUS
Qty: 0 | Refills: 0 | DISCHARGE
Start: 2025-01-26

## 2025-01-26 RX ORDER — WITCH HAZEL 86 ML/100ML
1 OIL ORAL; TOPICAL
Qty: 0 | Refills: 0 | DISCHARGE
Start: 2025-01-26

## 2025-01-26 RX ADMIN — IBUPROFEN 600 MILLIGRAM(S): 600 TABLET, FILM COATED ORAL at 13:45

## 2025-01-26 RX ADMIN — IBUPROFEN 600 MILLIGRAM(S): 600 TABLET, FILM COATED ORAL at 00:04

## 2025-01-26 RX ADMIN — IBUPROFEN 600 MILLIGRAM(S): 600 TABLET, FILM COATED ORAL at 05:59

## 2025-01-26 RX ADMIN — IBUPROFEN 600 MILLIGRAM(S): 600 TABLET, FILM COATED ORAL at 14:45

## 2025-01-26 RX ADMIN — IBUPROFEN 600 MILLIGRAM(S): 600 TABLET, FILM COATED ORAL at 00:34

## 2025-01-26 RX ADMIN — ACETAMINOPHEN 975 MILLIGRAM(S): 160 SUSPENSION ORAL at 03:45

## 2025-01-26 RX ADMIN — ACETAMINOPHEN 975 MILLIGRAM(S): 160 SUSPENSION ORAL at 03:15

## 2025-01-26 RX ADMIN — IBUPROFEN 600 MILLIGRAM(S): 600 TABLET, FILM COATED ORAL at 05:29

## 2025-01-26 NOTE — PROGRESS NOTE ADULT - ASSESSMENT
28yo PPD#2 s/p VAVD.  Patient is stable and doing well post-partum.      - Pain well controlled, continue current pain regimen  - Increase ambulation, SCDs when not ambulating  - Continue regular diet  - Nexplanon placed  - Pt to follow up with CRS after d/c for hx of 4th degree with poor rectal tone  noted at time of delivery   - Discharge planning     Veronika Wellington, PGY-1

## 2025-01-26 NOTE — PROGRESS NOTE ADULT - SUBJECTIVE AND OBJECTIVE BOX
INT [id number]     30yo PPD#2 s/p VAVD. Patient feels well. Pain is well controlled, tolerating regular diet, passing flatus, voiding spontaneously, ambulating without difficulty. Denies heavy vaginal bleeding, CP/SOB, lightheadedness/dizziness, N/V.    O:  Vitals:   Vital Signs Last 24 Hrs  T(C): 36.7 (26 Jan 2025 06:34), Max: 36.9 (25 Jan 2025 10:11)  T(F): 98.1 (26 Jan 2025 06:34), Max: 98.4 (25 Jan 2025 10:11)  HR: 92 (26 Jan 2025 06:34) (78 - 92)  BP: 107/66 (26 Jan 2025 06:34) (107/66 - 119/76)  BP(mean): --  RR: 19 (26 Jan 2025 06:34) (17 - 19)  SpO2: 100% (26 Jan 2025 06:34) (99% - 100%)    Parameters below as of 26 Jan 2025 06:34  Patient On (Oxygen Delivery Method): room air        MEDICATIONS  (STANDING):  acetaminophen     Tablet .. 975 milliGRAM(s) Oral <User Schedule>  diphtheria/tetanus/pertussis (acellular) Vaccine (Adacel) 0.5 milliLiter(s) IntraMuscular once  etonogestrel Implant 68 milliGRAM(s) SubDermal once  ibuprofen  Tablet. 600 milliGRAM(s) Oral every 6 hours  lidocaine 1% Injectable 10 milliLiter(s) Local Injection once  oxytocin Infusion 167 milliUNIT(s)/Min (167 mL/Hr) IV Continuous <Continuous>  prenatal multivitamin 1 Tablet(s) Oral daily  senna 2 Tablet(s) Oral at bedtime  sodium chloride 0.9% Bolus 300 milliLiter(s) IV Bolus once  sodium chloride 0.9% lock flush 3 milliLiter(s) IV Push every 8 hours  sodium chloride 0.9%. 1000 milliLiter(s) (125 mL/Hr) IV Continuous <Continuous>      MEDICATIONS  (PRN):  benzocaine 20%/menthol 0.5% Spray 1 Spray(s) Topical every 6 hours PRN for Perineal discomfort  dibucaine 1% Ointment 1 Application(s) Topical every 6 hours PRN Perineal discomfort  diphenhydrAMINE 25 milliGRAM(s) Oral every 6 hours PRN Pruritus  hydrocortisone 1% Cream 1 Application(s) Topical every 6 hours PRN Moderate Pain (4-6)  lanolin Ointment 1 Application(s) Topical every 6 hours PRN nipple soreness  magnesium hydroxide Suspension 30 milliLiter(s) Oral two times a day PRN Constipation  oxyCODONE    IR 5 milliGRAM(s) Oral every 3 hours PRN Moderate to Severe Pain (4-10)  oxyCODONE    IR 5 milliGRAM(s) Oral once PRN Moderate to Severe Pain (4-10)  pramoxine 1%/zinc 5% Cream 1 Application(s) Topical every 4 hours PRN Moderate Pain (4-6)  simethicone 80 milliGRAM(s) Chew every 4 hours PRN Gas  witch hazel Pads 1 Application(s) Topical every 4 hours PRN Perineal discomfort        Labs:  Blood type: O Positive  Rubella IgG: RPR: Negative                          11.0[L]   9.25 >-----------< 205    ( 01-25 @ 06:13 )             32.3[L]                        12.4   6.61 >-----------< 217    ( 01-23 @ 13:00 )             37.2                  Physical Exam:  General: NAD  Abdomen: soft, non-tender, non-distended, fundus firm  Vaginal: No heavy vaginal bleeding  Extremities: No erythema/edema

## 2025-01-26 NOTE — PROGRESS NOTE ADULT - ATTENDING COMMENTS
[id number]    Pt seen and examined.  Pt reports cramping when breastfeeding, explained this is normal and recommended Motrin.  Pt denies HA, CP, SOB, calf pain, fevers or chills.  Pt tolerating regular diet -N/-V, pt reports +flatus and +voluntary BM.  Pt denies any periods of fecal or flatal incontinence since admission.  Pt voiding and ambulating without difficulty and reports decreasing lochia.     ICU Vital Signs Last 24 Hrs  T(C): 36.7 (26 Jan 2025 06:34), Max: 36.7 (25 Jan 2025 17:57)  T(F): 98.1 (26 Jan 2025 06:34), Max: 98.1 (26 Jan 2025 06:34)  HR: 92 (26 Jan 2025 06:34) (78 - 92)  BP: 107/66 (26 Jan 2025 06:34) (107/66 - 119/76)  BP(mean): --  ABP: --  ABP(mean): --  RR: 19 (26 Jan 2025 06:34) (18 - 19)  SpO2: 100% (26 Jan 2025 06:34) (99% - 100%)    O2 Parameters below as of 26 Jan 2025 06:34  Patient On (Oxygen Delivery Method): room air    General: NAD, breastfeeding   Abd: fundus firm nontender  Ext: no calf tenderness b/l       PPD#2 sp VAVD course complicated by 3rd degree laceration and GDMA2.  PMH fecal and flatal incontinence after 11lb VD in [place]   1.  3rd degree laceration sp cefotetan recommend low fiber diet and stool softeners  2.  GDMA2 recommend repeat testing pp  3.  Hx of fecal and flatal incontinence contact information for colorectal surgery provided in discharge summary  4.  Pt cleared for discharge home today    Ema Gurrola MD
Pt seen and examined at bedside. Agree with the documentation as written.    28 yo  who is PPD1 s/p VAVD with 3rd degree perineal laceration. Pregnancy was c/b GDMA2.    [place] [id number] used at bedside.     Recovering well. Pain reports has been uncontrolled but has not tried narcotic medication. Lochia appropriate. Breastfeeding without issue. Tolerating po, voiding, passing flatus, ambulating w/o issue. Denies leakage of stool or flatus currently.     Vital Signs Last 24 Hrs  T(C): 36.9 (2025 10:11), Max: 37.1 (2025 21:24)  T(F): 98.4 (2025 10:11), Max: 98.7 (2025 21:24)  HR: 92 (2025 10:11) (74 - 92)  BP: 108/54 (2025 10:11) (107/65 - 119/63)  BP(mean): 77 (2025 02:15) (77 - 77)  RR: 17 (2025 10:11) (16 - 18)  SpO2: 99% (2025 10:11) (97% - 100%)    Parameters below as of 2025 10:11  Patient On (Oxygen Delivery Method): room air    Gen: NAD  CV: regular rate  Lung: unlabored   Abd: post-gravid, soft, nontender, nondistended, fundus UU  Ext: no calf tenderness, trace edema  Neuro: alert                           11.0   9.25  )-----------( 205      ( 2025 06:13 )             32.3     -s/p cefotetan for 3rd degree tear  -will start senna scheduled for bowel regimen given 3rd degree tear   -plan for outpatient CRS due to poor rectal tone/sphincter noted at delivery  -meeting pp milestones, counseled on trying narcotic medications for pain  -lactation available  -hgb 12.4 > 11.0, appropriate  -contraception nexplanon, will plan to place today or tomorrow if possible  -continue routine pp care  -plan for circumcision today or tomorrow pending availability   -anticipate DC PPD2    G Charli LEMUS

## 2025-01-27 ENCOUNTER — APPOINTMENT (OUTPATIENT)
Dept: MATERNAL FETAL MEDICINE | Facility: HOSPITAL | Age: 30
End: 2025-01-27

## 2025-01-27 ENCOUNTER — APPOINTMENT (OUTPATIENT)
Dept: ANTEPARTUM | Facility: HOSPITAL | Age: 30
End: 2025-01-27

## 2025-01-30 ENCOUNTER — APPOINTMENT (OUTPATIENT)
Dept: MATERNAL FETAL MEDICINE | Facility: HOSPITAL | Age: 30
End: 2025-01-30

## 2025-02-03 ENCOUNTER — APPOINTMENT (OUTPATIENT)
Dept: ANTEPARTUM | Facility: HOSPITAL | Age: 30
End: 2025-02-03

## 2025-02-03 ENCOUNTER — APPOINTMENT (OUTPATIENT)
Dept: MATERNAL FETAL MEDICINE | Facility: HOSPITAL | Age: 30
End: 2025-02-03

## 2025-02-06 ENCOUNTER — APPOINTMENT (OUTPATIENT)
Dept: MATERNAL FETAL MEDICINE | Facility: HOSPITAL | Age: 30
End: 2025-02-06

## 2025-02-10 ENCOUNTER — APPOINTMENT (OUTPATIENT)
Dept: MATERNAL FETAL MEDICINE | Facility: HOSPITAL | Age: 30
End: 2025-02-10

## 2025-05-12 ENCOUNTER — APPOINTMENT (OUTPATIENT)
Dept: OBGYN | Facility: CLINIC | Age: 30
End: 2025-05-12

## 2025-05-27 ENCOUNTER — NON-APPOINTMENT (OUTPATIENT)
Age: 30
End: 2025-05-27